# Patient Record
Sex: FEMALE | Race: OTHER | URBAN - METROPOLITAN AREA
[De-identification: names, ages, dates, MRNs, and addresses within clinical notes are randomized per-mention and may not be internally consistent; named-entity substitution may affect disease eponyms.]

---

## 2017-04-24 VITALS
HEART RATE: 57 BPM | TEMPERATURE: 98 F | SYSTOLIC BLOOD PRESSURE: 128 MMHG | OXYGEN SATURATION: 97 % | DIASTOLIC BLOOD PRESSURE: 63 MMHG | HEIGHT: 60 IN | WEIGHT: 174.39 LBS

## 2017-04-24 RX ORDER — CHLORHEXIDINE GLUCONATE 213 G/1000ML
1 SOLUTION TOPICAL ONCE
Qty: 0 | Refills: 0 | Status: DISCONTINUED | OUTPATIENT
Start: 2017-04-25 | End: 2017-05-01

## 2017-04-24 NOTE — H&P ADULT - HISTORY OF PRESENT ILLNESS
***Hx obtained from son- Phong Antonio***    70 y/o Greek speaking Female, with PMHx significant for hyperlipidemia, depression, who was admitted to  last week for near syncopal episode, was found to be bradycardic with HR of 48 as per son.  Pt reports feeling dizzy and lightheaded upon standing up from sitting position, denied LOC.  Pt denied any chest pain, SOB, n/v, palpitations, diaphoresis, orthopnea, PND. Pt was r/o for MI.  Further cardiac work-up included an ECHO which showed an EF 69%, mild TR, and a stress ECHO on 4/19/17 which revealed EF 60%, hypertrophic cardiomyopathy, stress induced ischemia of the basal-mid inferior wall and entire septal wall.  Pt was started on a new regimen of medications: Aspirin, Plavix, and low dose Verapamil.    In light of pt's risk factors, above symptoms and recent positive stress test, she is recommended for cardiac cath with possible intervention for suspected CAD. ***Hx obtained from son- Phong Antonio***    72 y/o Bolivian speaking Female, with PMHx significant for hyperlipidemia, depression, who was admitted to  last week for near syncopal episode, was found to be bradycardic with HR of 48 as per son.  Pt reports feeling dizzy and lightheaded upon standing up from sitting position, denied LOC.  Pt denied any chest pain, SOB, n/v, palpitations, diaphoresis, orthopnea, PND. Pt was r/o for MI.  Further cardiac work-up included an ECHO which showed an EF 69%, mild TR, and a stress ECHO on 4/19/17 which revealed EF 60%, hypertrophic cardiomyopathy, stress induced ischemia of the basal-mid inferior wall and entire septal wall.  Pt was started on a new regimen of medications: Aspirin, Plavix, and low dose Verapamil.    In light of pt's risk factors, above symptoms and recent positive stress ECHO, she is recommended for cardiac cath with possible intervention for suspected CAD.

## 2017-04-24 NOTE — H&P ADULT - NSHPLABSRESULTS_GEN_ALL_CORE
ECG:sinus jennifer @57 BPM with LVH and 1 degree AV block with HI duration of 200 ms and prolonged /473 ms duration

## 2017-04-24 NOTE — H&P ADULT - ASSESSMENT
72 y/o Kinyarwanda speaking Female, with PMHx significant for hyperlipidemia, depression, who was admitted to  last week for near syncopal episode, was found to be bradycardic with HR of 48 as per son. Pt presents today to [facility] for recommended cardiac cath with possible intervention for suspected CAD in light of pt's risk factors, above symptoms, recent positive stress echo.    ASA III and Mallamapti III    OF NOTE: The consent was taken and procedure was explained with the help of [employer]  Atul #[id number].    Risks & benefits of procedure and alternative therapy have been explained to the patient including but not limited to: allergic reaction, bleeding w/possible need for blood transfusion, infection, renal and vascular compromise, limb damage, arrhythmia, stroke, vessel dissection/perforation, Myocardial infarction, emergent CABG. Informed consent obtained and in chart. 70 y/o Kiswahili speaking Female, with PMHx significant for hyperlipidemia, depression, who was admitted to  last week for near syncopal episode, was found to be bradycardic with HR of 48 as per son. Pt presents today to [facility] for recommended cardiac cath with possible intervention for suspected CAD in light of pt's risk factors, above symptoms, recent positive stress echo.    ASA III and Mallamapti III    OF NOTE: The consent was taken and procedure was explained with the help of [employer]  Atul #[id number]. Pt was given ASA 81 mg POx1 and Plavix 75 mg POx1.    Risks & benefits of procedure and alternative therapy have been explained to the patient including but not limited to: allergic reaction, bleeding w/possible need for blood transfusion, infection, renal and vascular compromise, limb damage, arrhythmia, stroke, vessel dissection/perforation, Myocardial infarction, emergent CABG. Informed consent obtained and in chart.

## 2017-04-25 ENCOUNTER — INPATIENT (INPATIENT)
Facility: HOSPITAL | Age: 72
LOS: 5 days | Discharge: HOME HEALTH | DRG: 225 | End: 2017-05-01
Attending: INTERNAL MEDICINE | Admitting: INTERNAL MEDICINE
Payer: COMMERCIAL

## 2017-04-25 LAB
ANION GAP SERPL CALC-SCNC: 8 MMOL/L — LOW (ref 9–16)
APTT BLD: 34.6 SEC — SIGNIFICANT CHANGE UP (ref 27.5–37.4)
BASOPHILS NFR BLD AUTO: 0.9 % — SIGNIFICANT CHANGE UP (ref 0–2)
BUN SERPL-MCNC: 21 MG/DL — SIGNIFICANT CHANGE UP (ref 7–23)
CALCIUM SERPL-MCNC: 9 MG/DL — SIGNIFICANT CHANGE UP (ref 8.5–10.5)
CHLORIDE SERPL-SCNC: 108 MMOL/L — SIGNIFICANT CHANGE UP (ref 96–108)
CO2 SERPL-SCNC: 25 MMOL/L — SIGNIFICANT CHANGE UP (ref 22–31)
CREAT SERPL-MCNC: 0.84 MG/DL — SIGNIFICANT CHANGE UP (ref 0.5–1.3)
EOSINOPHIL NFR BLD AUTO: 2.4 % — SIGNIFICANT CHANGE UP (ref 0–6)
GLUCOSE SERPL-MCNC: 90 MG/DL — SIGNIFICANT CHANGE UP (ref 70–99)
HCT VFR BLD CALC: 42.5 % — SIGNIFICANT CHANGE UP (ref 34.5–45)
HGB BLD-MCNC: 14.8 G/DL — SIGNIFICANT CHANGE UP (ref 11.5–15.5)
INR BLD: 0.96 — SIGNIFICANT CHANGE UP (ref 0.88–1.16)
LYMPHOCYTES # BLD AUTO: 35.1 % — SIGNIFICANT CHANGE UP (ref 13–44)
MCHC RBC-ENTMCNC: 29 PG — SIGNIFICANT CHANGE UP (ref 27–34)
MCHC RBC-ENTMCNC: 34.8 G/DL — SIGNIFICANT CHANGE UP (ref 32–36)
MCV RBC AUTO: 83.2 FL — SIGNIFICANT CHANGE UP (ref 80–100)
MONOCYTES NFR BLD AUTO: 7.7 % — SIGNIFICANT CHANGE UP (ref 2–14)
NEUTROPHILS NFR BLD AUTO: 53.9 % — SIGNIFICANT CHANGE UP (ref 43–77)
PLATELET # BLD AUTO: 279 K/UL — SIGNIFICANT CHANGE UP (ref 150–400)
POTASSIUM SERPL-MCNC: 4.4 MMOL/L — SIGNIFICANT CHANGE UP (ref 3.5–5.3)
POTASSIUM SERPL-SCNC: 4.4 MMOL/L — SIGNIFICANT CHANGE UP (ref 3.5–5.3)
PROTHROM AB SERPL-ACNC: 10.7 SEC — SIGNIFICANT CHANGE UP (ref 9.8–12.7)
RBC # BLD: 5.11 M/UL — SIGNIFICANT CHANGE UP (ref 3.8–5.2)
RBC # FLD: 14.3 % — SIGNIFICANT CHANGE UP (ref 10.3–16.9)
SODIUM SERPL-SCNC: 141 MMOL/L — SIGNIFICANT CHANGE UP (ref 135–145)
WBC # BLD: 6.6 K/UL — SIGNIFICANT CHANGE UP (ref 3.8–10.5)
WBC # FLD AUTO: 6.6 K/UL — SIGNIFICANT CHANGE UP (ref 3.8–10.5)

## 2017-04-25 PROCEDURE — 93460 R&L HRT ART/VENTRICLE ANGIO: CPT | Mod: 26

## 2017-04-25 PROCEDURE — 99223 1ST HOSP IP/OBS HIGH 75: CPT

## 2017-04-25 PROCEDURE — 93010 ELECTROCARDIOGRAM REPORT: CPT

## 2017-04-25 PROCEDURE — 93306 TTE W/DOPPLER COMPLETE: CPT | Mod: 26

## 2017-04-25 PROCEDURE — 75561 CARDIAC MRI FOR MORPH W/DYE: CPT | Mod: 26

## 2017-04-25 RX ORDER — SODIUM CHLORIDE 9 MG/ML
1000 INJECTION INTRAMUSCULAR; INTRAVENOUS; SUBCUTANEOUS
Qty: 0 | Refills: 0 | Status: DISCONTINUED | OUTPATIENT
Start: 2017-04-25 | End: 2017-05-01

## 2017-04-25 RX ORDER — CLOPIDOGREL BISULFATE 75 MG/1
75 TABLET, FILM COATED ORAL ONCE
Qty: 0 | Refills: 0 | Status: COMPLETED | OUTPATIENT
Start: 2017-04-25 | End: 2017-04-25

## 2017-04-25 RX ORDER — ASPIRIN/CALCIUM CARB/MAGNESIUM 324 MG
81 TABLET ORAL ONCE
Qty: 0 | Refills: 0 | Status: DISCONTINUED | OUTPATIENT
Start: 2017-04-25 | End: 2017-04-25

## 2017-04-25 RX ORDER — ZOLPIDEM TARTRATE 10 MG/1
5 TABLET ORAL ONCE
Qty: 0 | Refills: 0 | Status: DISCONTINUED | OUTPATIENT
Start: 2017-04-25 | End: 2017-04-25

## 2017-04-25 RX ORDER — SODIUM CHLORIDE 9 MG/ML
500 INJECTION INTRAMUSCULAR; INTRAVENOUS; SUBCUTANEOUS
Qty: 0 | Refills: 0 | Status: DISCONTINUED | OUTPATIENT
Start: 2017-04-25 | End: 2017-04-25

## 2017-04-25 RX ORDER — ZOLPIDEM TARTRATE 10 MG/1
5 TABLET ORAL AT BEDTIME
Qty: 0 | Refills: 0 | Status: DISCONTINUED | OUTPATIENT
Start: 2017-04-25 | End: 2017-05-01

## 2017-04-25 RX ORDER — VERAPAMIL HCL 240 MG
80 CAPSULE, EXTENDED RELEASE PELLETS 24 HR ORAL EVERY 12 HOURS
Qty: 0 | Refills: 0 | Status: DISCONTINUED | OUTPATIENT
Start: 2017-04-25 | End: 2017-04-28

## 2017-04-25 RX ORDER — ASPIRIN/CALCIUM CARB/MAGNESIUM 324 MG
81 TABLET ORAL DAILY
Qty: 0 | Refills: 0 | Status: DISCONTINUED | OUTPATIENT
Start: 2017-04-26 | End: 2017-05-01

## 2017-04-25 RX ORDER — ATORVASTATIN CALCIUM 80 MG/1
20 TABLET, FILM COATED ORAL DAILY
Qty: 0 | Refills: 0 | Status: DISCONTINUED | OUTPATIENT
Start: 2017-04-25 | End: 2017-05-01

## 2017-04-25 RX ORDER — ASPIRIN/CALCIUM CARB/MAGNESIUM 324 MG
81 TABLET ORAL ONCE
Qty: 0 | Refills: 0 | Status: COMPLETED | OUTPATIENT
Start: 2017-04-25 | End: 2017-04-25

## 2017-04-25 RX ORDER — INFLUENZA VIRUS VACCINE 15; 15; 15; 15 UG/.5ML; UG/.5ML; UG/.5ML; UG/.5ML
0.5 SUSPENSION INTRAMUSCULAR ONCE
Qty: 0 | Refills: 0 | Status: COMPLETED | OUTPATIENT
Start: 2017-04-25 | End: 2017-04-25

## 2017-04-25 RX ORDER — ACETAMINOPHEN 500 MG
650 TABLET ORAL EVERY 6 HOURS
Qty: 0 | Refills: 0 | Status: DISCONTINUED | OUTPATIENT
Start: 2017-04-25 | End: 2017-05-01

## 2017-04-25 RX ORDER — CITALOPRAM 10 MG/1
10 TABLET, FILM COATED ORAL DAILY
Qty: 0 | Refills: 0 | Status: DISCONTINUED | OUTPATIENT
Start: 2017-04-25 | End: 2017-05-01

## 2017-04-25 RX ORDER — OLANZAPINE 15 MG/1
10 TABLET, FILM COATED ORAL DAILY
Qty: 0 | Refills: 0 | Status: DISCONTINUED | OUTPATIENT
Start: 2017-04-25 | End: 2017-05-01

## 2017-04-25 RX ADMIN — ZOLPIDEM TARTRATE 5 MILLIGRAM(S): 10 TABLET ORAL at 23:32

## 2017-04-25 RX ADMIN — SODIUM CHLORIDE 75 MILLILITER(S): 9 INJECTION INTRAMUSCULAR; INTRAVENOUS; SUBCUTANEOUS at 12:16

## 2017-04-25 RX ADMIN — CITALOPRAM 10 MILLIGRAM(S): 10 TABLET, FILM COATED ORAL at 19:16

## 2017-04-25 RX ADMIN — Medication 650 MILLIGRAM(S): at 21:09

## 2017-04-25 RX ADMIN — SODIUM CHLORIDE 40 MILLILITER(S): 9 INJECTION INTRAMUSCULAR; INTRAVENOUS; SUBCUTANEOUS at 15:08

## 2017-04-25 RX ADMIN — CLOPIDOGREL BISULFATE 75 MILLIGRAM(S): 75 TABLET, FILM COATED ORAL at 12:17

## 2017-04-25 RX ADMIN — Medication 650 MILLIGRAM(S): at 20:01

## 2017-04-25 RX ADMIN — Medication 81 MILLIGRAM(S): at 12:17

## 2017-04-25 RX ADMIN — ATORVASTATIN CALCIUM 20 MILLIGRAM(S): 80 TABLET, FILM COATED ORAL at 23:30

## 2017-04-25 NOTE — CONSULT NOTE ADULT - SUBJECTIVE AND OBJECTIVE BOX
CHIEF COMPLAINT: syncope    HISTORY OF PRESENT ILLNESS: 72 yo F (Persian speaking)  , with history of HLD, depression, who was admitted to  last week for near syncopal episode, was found to be bradycardic with HR of 48 as per son.  Pt reports feeling dizzy and lightheaded upon standing up from sitting position,     PAST MEDICAL & SURGICAL HISTORY:  Hemorrhoids  Depression  Hyperlipidemia  No significant past surgical history        PERTINENT DIAGNOSTIC TESTING:    [ ] Echocardiogram:      Allergies    No Known Allergies    Intolerances    	    MEDICATIONS:  verapamil 80milliGRAM(s) Oral every 12 hours        citalopram 10milliGRAM(s) Oral daily  OLANZapine 10milliGRAM(s) Oral daily  zolpidem 5milliGRAM(s) Oral at bedtime PRN  zolpidem 5milliGRAM(s) Oral at bedtime PRN      atorvastatin 20milliGRAM(s) Oral daily    chlorhexidine 4% Liquid 1Application(s) Topical once  sodium chloride 0.9%. 1000milliLiter(s) IV Continuous <Continuous>      FAMILY HISTORY:  No pertinent family history in first degree relatives      SOCIAL HISTORY:    [ ] Non-smoker  [ ] Smoker  [ ] Alcohol        REVIEW OF SYSTEMS:    CONSTITUTIONAL: No fever, weight loss, or fatigue  EYES: No eye pain, visual disturbances, or discharge  ENMT:  No difficulty hearing, tinnitus, vertigo; No sinus or throat pain  NECK: No pain or stiffness  BREASTS: No pain, masses, or nipple discharge  RESPIRATORY: No cough, wheezing, chills or hemoptysis; No Shortness of Breath  CARDIOVASCULAR: No chest pain, palpitations, dizziness, or leg swelling  GASTROINTESTINAL: No abdominal or epigastric pain. No nausea, vomiting, or hematemesis; No diarrhea or constipation. No melena or hematochezia.  GENITOURINARY: No dysuria, frequency, hematuria, or incontinence  NEUROLOGICAL: No headaches, memory loss, loss of strength, numbness, or tremors  SKIN: No itching, burning, rashes, or lesions   LYMPH Nodes: No enlarged glands  ENDOCRINE: No heat or cold intolerance; No hair loss  MUSCULOSKELETAL: No joint pain or swelling; No muscle, back, or extremity pain  PSYCHIATRIC: No depression, anxiety, mood swings, or difficulty sleeping  HEME/LYMPH: No easy bruising, or bleeding gums  ALLERY AND IMMUNOLOGIC: No hives or eczema	    [ ] All others negative	  [ ] Unable to obtain    PHYSICAL EXAM:  T(C): --  HR: --  BP: --  RR: --  SpO2: --  Wt(kg): --  I&O's Summary      TELEMETRY: 	    ECG:     Appearance: Normal	  HEENT:   Normal oral mucosa, PERRL, EOMI	  Cardiovascular: Normal S1 S2, No JVD, No murmurs, No edema  Respiratory: Lungs clear to auscultation	  Gastrointestinal:  Soft, Non-tender, + BS	  Neurologic: A&O x 3, Non-focal  Extremities: Normal range of motion, No clubbing, cyanosis or edema  Vascular: Peripheral pulses palpable 2+ bilaterally    	  LABS:	 	    CARDIAC MARKERS:                                  14.8   6.6   )-----------( 279      ( 25 Apr 2017 11:15 )             42.5     04-25    141  |  108  |  21  ----------------------------<  90  4.4   |  25  |  0.84    Ca    9.0      25 Apr 2017 11:15      proBNP:   Lipid Profile:   HgA1c:   TSH:     ASSESSMENT/PLAN: CHIEF COMPLAINT: syncope    HISTORY OF PRESENT ILLNESS: 72 yo F (French speaking)  , with history of HLD, depression, who was admitted to  last week for near syncopal episode, was found to be bradycardic with HR of 48 as per son, dizziness  and lightheadedness Patient was transferred to [facility] for cardiac angiogram and noted to have HOCM.    PAST MEDICAL & SURGICAL HISTORY:  Hemorrhoids  Depression  Hyperlipidemia  No significant past surgical history        PERTINENT DIAGNOSTIC TESTING:    [ ] Echocardiogram: stress ECHO on 4/19/17 which revealed EF 60%, hypertrophic cardiomyopathy      Allergies    No Known Allergies    Intolerances    	    MEDICATIONS:  verapamil 80milliGRAM(s) Oral every 12 hours  citalopram 10milliGRAM(s) Oral daily  OLANZapine 10milliGRAM(s) Oral daily  zolpidem 5milliGRAM(s) Oral at bedtime PRN  zolpidem 5milliGRAM(s) Oral at bedtime PRN  atorvastatin 20milliGRAM(s) Oral daily  chlorhexidine 4% Liquid 1Application(s) Topical once  sodium chloride 0.9%. 1000milliLiter(s) IV Continuous <Continuous>      FAMILY HISTORY:  No pertinent family history in first degree relatives      SOCIAL HISTORY:    [x ] Non-smoker  [ ] Smoker  [ ] Alcohol        REVIEW OF SYSTEMS:    CONSTITUTIONAL: No fever, weight loss, or fatigue  EYES: No eye pain, visual disturbances, or discharge  ENMT:  No difficulty hearing, tinnitus, vertigo; No sinus or throat pain  NECK: No pain or stiffness  RESPIRATORY: No cough, wheezing, chills or hemoptysis; No Shortness of Breath  CARDIOVASCULAR: No chest pain, palpitations, + dizziness, or leg swelling  GASTROINTESTINAL: No abdominal or epigastric pain. No nausea, vomiting, or hematemesis; No diarrhea or constipation. No melena or hematochezia.  GENITOURINARY: No dysuria, frequency, hematuria, or incontinence  NEUROLOGICAL: No headaches, memory loss, loss of strength, numbness, or tremors  SKIN: No itching, burning, rashes, or lesions     PHYSICAL EXAM:      TELEMETRY: 	 SR   ECG: PND    Appearance: Normal	  HEENT:   EOMI	  Cardiovascular: Normal S1 S2, No JVD, No murmurs, no edema  Respiratory: Lungs clear to auscultation	  Gastrointestinal:  Soft, Non-tender, + BS	  Neurologic: A&O x 3  Extremities: no edema      	  LABS:	 	    CARDIAC MARKERS:                                  14.8   6.6   )-----------( 279      ( 25 Apr 2017 11:15 )             42.5     04-25    141  |  108  |  21  ----------------------------<  90  4.4   |  25  |  0.84    Ca    9.0      25 Apr 2017 11:15      proBNP:   Lipid Profile:   HgA1c:   TSH:     ASSESSMENT/PLAN: 	   72 yo F (French speaking), with history of HLD, depression, who was admitted to  last week for near syncopal episode, was found to be bradycardic with HR of 48 as per son, dizziness  and lightheadedness Patient was transferred to [facility] for cardiac angiogram and noted to have HOCM.  Please get echocardiogram and cardiac MRI, will follow.

## 2017-04-25 NOTE — PROGRESS NOTE ADULT - SUBJECTIVE AND OBJECTIVE BOX
Patient with syncope and ?CP at [facility] last week  Found to have HOCM and abn Dobutamine stress echo    Now post R & L Cath    Findings:  Right sided pressures are not significantly elevated    LM - Luminal Irreg  LAD - Very large, clear obstruction of septal arteries in systole  LCx - Luminal Irreg  RCA - Dominant, luminal irreg    LV significant hypertrophy, hyperdynamic  EF 70% range    Resting gradient LV-Ao of approximately 35-40 mmHg  With significant increase of gradient and decrease of Ao pulse pressure after PVC beat    Plan:  1. Admit to 5 uris and plan for cardiac MRI  2. EP eval- Dr. Evans  3. Continue and uptitrate Verapamil  4. Structural heart eval - Dr. Be  5. Cont ASA, stop Plavix    Troy Mangla, DO FSCAI

## 2017-04-25 NOTE — CONSULT NOTE ADULT - SUBJECTIVE AND OBJECTIVE BOX
Surgeon:    Requesting Physician:    HISTORY OF PRESENT ILLNESS:  70 y/o [place] speaking F, with PMHx significant for hyperlipidemia, depression, who was admitted to  last week for near syncopal episode, was found to be bradycardic with HR of 48 as per son.  Pt reports feeling dizzy and lightheaded upon standing up from sitting position, denied LOC.  Pt denied any chest pain, SOB, n/v, palpitations, diaphoresis, orthopnea, PND. Pt was r/o for MI.  Further cardiac work-up included an ECHO which showed an EF 69%, mild TR, and a stress ECHO on 4/19/17 which revealed EF 60%, hypertrophic cardiomyopathy, stress induced ischemia of the basal-mid inferior wall and entire septal wall.  Pt was started on a new regimen of medications: Aspirin, Plavix, and low dose Verapamil. In light of pt's risk factors, above symptoms and recent positive stress ECHO, she is recommended for cardiac cath with possible intervention for suspected CAD. Cardiac Catheterization on 4/25/17 revealed right sided pressures are not significantly elevated. LAD - Very large, clear obstruction of septal arteries in systole. LV significant hypertrophy, hyperdynamic EF 70%. Resting gradient LV-Ao of approximately 35-40 mmHg. With significant increase of gradient and decrease of Ao pulse pressure after PVC beat.  In light of pts HCM seen on cardiac cath Dr. Be was consulted for evaluation.      PAST MEDICAL & SURGICAL HISTORY:  Hemorrhoids  Depression  Hyperlipidemia  No significant past surgical history      MEDICATIONS  (STANDING):  chlorhexidine 4% Liquid 1Application(s) Topical once  sodium chloride 0.9%. 1000milliLiter(s) IV Continuous <Continuous>  verapamil 80milliGRAM(s) Oral every 12 hours  citalopram 10milliGRAM(s) Oral daily  atorvastatin 20milliGRAM(s) Oral daily  OLANZapine 10milliGRAM(s) Oral daily    MEDICATIONS  (PRN):  zolpidem 5milliGRAM(s) Oral at bedtime PRN Insomnia  zolpidem 5milliGRAM(s) Oral at bedtime PRN Insomnia      Allergies    No Known Allergies    Intolerances        SOCIAL HISTORY:  Smoker:  YES / NO        PACK YEARS:                         WHEN QUIT?  ETOH use:  YES / NO               FREQUENCY / QUANTITY:  Ilicit Drug use:  YES / NO  Occupation:  Assisted device use (Cane / Walker):  Live with:    FAMILY HISTORY:  No pertinent family history in first degree relatives      Review of Systems  CONSTITUTIONAL:  Fevers / chills, sweats, fatigue, weight loss, weight gain                                    NEGATIVE  NEURO:  parathesias, seizures, syncope, confusion                                                                               NEGATIVE  EYES:  Blurry vision, discharge, pain, loss of vision                                                                                  NEGATIVE  ENMT:  Difficulty hearing, vertigo, dysphagia, epistaxis, recent dental work                                     NEGATIVE  CV:  Chest pain, palpitations, LOVELL, orthopnea                                                                                         NEGATIVE  RESPIRATORY:  Wheezing, SOB, cough / sputum, hemoptysis                                                              NEGATIVE  GI:  Nausea, vommiting, diarrhea, constipation, melena                                                                        NEGATIVE  : Hematuria, dysuria, urgency, incontinence                                                                                       NEGATIVE  MUSKULOSKELETAL:  arthritis, joint swelling, muscle weakness                                                           NEGATIVE  SKIN/BREAST:  rash, itching, bacilio loss, masses                                                                                            NEGATIVE  PSYCH:  depresion, anxiety, suicidal ideation                                                                                             NEGATIVE  HEME/LYMPH:  bruises easily, enlarged lymph nodes, tender lymph nodes                                        NEGATIVE  ENDOCRINE:  cold intolerance, heat intolerance, polydipsia                                                                   NEGATIVE    PHYSICAL EXAM  Vital Signs Last 24 Hrs  T(C): --  T(F): --  HR: --  BP: --  BP(mean): --  RR: --  SpO2: --    CONSTITUTIONAL:                                                                          WNL  NEURO:                                                                                             WNL                      EYES:                                                                                                  WNL  ENMT:                                                                                                WNL  CV:                                                                                                      WNL  RESPIRATORY:                                                                                  WNL  GI:                                                                                                       WNL  : LIZ + / -                                                                                 WNL  MUSKULOSKELETAL:                                                                       WNL  SKIN / BREAST:                                                                                 WNL                                                          LABS:                        14.8   6.6   )-----------( 279      ( 25 Apr 2017 11:15 )             42.5     04-25    141  |  108  |  21  ----------------------------<  90  4.4   |  25  |  0.84    Ca    9.0      25 Apr 2017 11:15      PT/INR - ( 25 Apr 2017 11:15 )   PT: 10.7 sec;   INR: 0.96          PTT - ( 25 Apr 2017 11:15 )  PTT:34.6 sec            RADIOLOGY & ADDITIONAL STUDIES:  CAROTID U/S:    CXR:    CT Scan:    EKG:    TTE / SWETHA:    Cardiac Cath: Surgeon: Dr. Be     Requesting Physician: Dr. Stephenson     HISTORY OF PRESENT ILLNESS:  72 y/o [place] speaking F, with PMHx significant for HLD, depression, who was admitted to  last week for near syncopal episode, was found to be bradycardic with HR of 48 as per son.  Pt reports feeling dizzy and lightheaded upon standing up from sitting position, denied LOC.  Pt denied any chest pain, SOB, n/v, palpitations, diaphoresis, orthopnea, PND. Pt was r/o for MI.  Further cardiac work-up included an ECHO which showed an EF 69%, mild TR, and a stress ECHO on 4/19/17 which revealed EF 60%, hypertrophic cardiomyopathy, stress induced ischemia of the basal-mid inferior wall and entire septal wall.  Pt was started on a new regimen of medications: Aspirin, Plavix, and low dose Verapamil. In light of pt's risk factors, above symptoms and recent positive stress ECHO, she is recommended for cardiac cath. Cardiac Catheterization on 4/25/17 revealed right sided pressures are not significantly elevated. LAD - Very large, clear obstruction of septal arteries in systole. LV significant hypertrophy, hyperdynamic EF 70%. Resting gradient LV-Ao of approximately 35-40 mmHg. With significant increase of gradient and decrease of Ao pulse pressure after PVC beat.  In light of pts HCM seen on cardiac cath Dr. Be was consulted for evaluation.      PAST MEDICAL & SURGICAL HISTORY:  Hemorrhoids  Depression  Hyperlipidemia  No significant past surgical history      MEDICATIONS  (STANDING):  chlorhexidine 4% Liquid 1Application(s) Topical once  sodium chloride 0.9%. 1000milliLiter(s) IV Continuous <Continuous>  verapamil 80milliGRAM(s) Oral every 12 hours  citalopram 10milliGRAM(s) Oral daily  atorvastatin 20milliGRAM(s) Oral daily  OLANZapine 10milliGRAM(s) Oral daily    MEDICATIONS  (PRN):  zolpidem 5milliGRAM(s) Oral at bedtime PRN Insomnia  zolpidem 5milliGRAM(s) Oral at bedtime PRN Insomnia      Allergies: No Known Allergies    Intolerances    SOCIAL HISTORY:  Smoker:  YES / NO        PACK YEARS:                         WHEN QUIT?  ETOH use:  YES / NO               FREQUENCY / QUANTITY:  Ilicit Drug use:  YES / NO  Occupation:  Assisted device use (Cane / Walker):  Live with:    FAMILY HISTORY:  No pertinent family history in first degree relatives      Review of Systems  CONSTITUTIONAL:    NEGATIVE  NEURO: +near syncope, +dizziness, +lightheadedness   EYES:  NEGATIVE  ENMT:   NEGATIVE  CV:  -Chest pain, -palpitations, -LOVELL, -orthopnea      RESPIRATORY:   NEGATIVE  GI:  NEGATIVE  :   NEGATIVE  MUSKULOSKELETAL:   NEGATIVE  SKIN/BREAST:      NEGATIVE  PSYCH:  +depression  HEME/LYMPH:    NEGATIVE  ENDOCRINE: NEGATIVE    PHYSICAL EXAM  Vital Signs Last 24 Hrs  T(C): --  T(F): --  HR: --  BP: --  BP(mean): --  RR: --  SpO2: --    CONSTITUTIONAL:                                                                          WNL  NEURO:                                                                                             WNL                      EYES:                                                                                                  WNL  ENMT:                                                                                                WNL  CV:                                                                                                      WNL  RESPIRATORY:                                                                                  WNL  GI:                                                                                                       WNL  : LIZ + / -                                                                                 WNL  MUSKULOSKELETAL:                                                                       WNL  SKIN / BREAST:                                                                                 WNL                                                          LABS:                        14.8   6.6   )-----------( 279      ( 25 Apr 2017 11:15 )             42.5     04-25    141  |  108  |  21  ----------------------------<  90  4.4   |  25  |  0.84    Ca    9.0      25 Apr 2017 11:15      PT/INR - ( 25 Apr 2017 11:15 )   PT: 10.7 sec;   INR: 0.96  PTT - ( 25 Apr 2017 11:15 )  PTT:34.6 sec    RADIOLOGY & ADDITIONAL STUDIES:  CAROTID U/S: ORDERED     CXR: ORDERED    EKG: ORDERED    TTE / SWETHA: See HPI: stress ECHO on 4/19/17 which revealed EF 60%, hypertrophic cardiomyopathy, stress induced ischemia of the basal-mid inferior wall and entire septal wall.    Cardiac Cath: R + L CATH 4/25/17: Right sided pressures are not significantly elevated. LAD - Very large, clear obstruction of septal arteries in systole. LV significant hypertrophy, hyperdynamic EF 70%. Resting gradient LV-Ao of approximately 35-40 mmHg. With significant increase of gradient and decrease of Ao pulse pressure after PVC beat    Assessment:    Plan: Surgeon: Dr. Tamayo     Requesting Physician: Dr. Stephenson     HISTORY OF PRESENT ILLNESS:  70 y/o [place] citizen speaking F, with PMHx significant for HLD, depression, who was admitted to  last week for near syncopal episode, was found to be bradycardic with HR of 48 as per son.  Pt reports feeling dizzy and lightheaded upon standing up from sitting position, denied LOC.  Pt denied any chest pain, SOB, n/v, palpitations, diaphoresis, orthopnea, PND. Pt was r/o for MI.  Further cardiac work-up included an ECHO which showed an EF 69%, mild TR, and a stress ECHO on 4/19/17 which revealed EF 60%, hypertrophic cardiomyopathy, stress induced ischemia of the basal-mid inferior wall and entire septal wall.  Pt was started on a new regimen of medications: Aspirin, Plavix, and low dose Verapamil. In light of pt's risk factors, above symptoms and recent positive stress ECHO, she is recommended for cardiac cath. Cardiac Catheterization on 4/25/17 revealed right sided pressures are not significantly elevated. LAD - Very large, clear obstruction of septal arteries in systole. LV significant hypertrophy, hyperdynamic EF 70%. Resting gradient LV-Ao of approximately 35-40 mmHg. With significant increase of gradient and decrease of Ao pulse pressure after PVC beat.  In light of pts HCM seen on cardiac cath Dr. Tamayo was consulted for evaluation.      PAST MEDICAL & SURGICAL HISTORY:  Hemorrhoids  Depression  Hyperlipidemia  No significant past surgical history      MEDICATIONS  (STANDING):  chlorhexidine 4% Liquid 1Application(s) Topical once  sodium chloride 0.9%. 1000milliLiter(s) IV Continuous <Continuous>  verapamil 80milliGRAM(s) Oral every 12 hours  citalopram 10milliGRAM(s) Oral daily  atorvastatin 20milliGRAM(s) Oral daily  OLANZapine 10milliGRAM(s) Oral daily    MEDICATIONS  (PRN):  zolpidem 5milliGRAM(s) Oral at bedtime PRN Insomnia  zolpidem 5milliGRAM(s) Oral at bedtime PRN Insomnia      Allergies: No Known Allergies    Intolerances    SOCIAL HISTORY:  Smoker:  NO       ETOH use:  NO       Ilicit Drug use:  NO  Assisted device use (Cane / Walker): none, however ambulation has been difficult lately due to dizziness     FAMILY HISTORY:  No pertinent family history in first degree relatives      Review of Systems  CONSTITUTIONAL:    NEGATIVE  NEURO: +near syncope, +dizziness, +lightheadedness   EYES:  NEGATIVE  ENMT:   NEGATIVE  CV:  -Chest pain, -palpitations, -LOVELL, -orthopnea      RESPIRATORY:   NEGATIVE  GI:  NEGATIVE  :   NEGATIVE  MUSKULOSKELETAL:   NEGATIVE  SKIN/BREAST:      NEGATIVE  PSYCH:  +depression  HEME/LYMPH:    NEGATIVE  ENDOCRINE: NEGATIVE    PHYSICAL EXAM  Vital Signs Last 24 Hrs  T(F): 97.6  HR: 60  BP: 128/63  RR: 16  SpO2: 97% RA    General: sitting upright in bed, cooperative during exam   Neuro: moving all extremities with no focal deficits  Cv: RRR, +systolic murmur   Resp: CTA b/l   GI: NT-ND, soft   Vac: +2radial b/l, +2 DP b/l +2femoral b/l   Neck: no bruits or murmur heard  Ext: no edema or calf tenderness b/l                                                           LABS:                        14.8   6.6   )-----------( 279      ( 25 Apr 2017 11:15 )             42.5     04-25    141  |  108  |  21  ----------------------------<  90  4.4   |  25  |  0.84    Ca    9.0      25 Apr 2017 11:15      PT/INR - ( 25 Apr 2017 11:15 )   PT: 10.7 sec;   INR: 0.96  PTT - ( 25 Apr 2017 11:15 )  PTT:34.6 sec    RADIOLOGY & ADDITIONAL STUDIES:    CXR: ORDERED    EKG: ORDERED    TTE / SWETHA: See HPI: stress ECHO on 4/19/17 which revealed EF 60%, hypertrophic cardiomyopathy, stress induced ischemia of the basal-mid inferior wall and entire septal wall.    Cardiac Cath: R + L CATH 4/25/17: Right sided pressures are not significantly elevated. LAD - Very large, clear obstruction of septal arteries in systole. LV significant hypertrophy, hyperdynamic EF 70%. Resting gradient LV-Ao of approximately 35-40 mmHg. With significant increase of gradient and decrease of Ao pulse pressure after PVC beat    Assessment: 70 y/o [place] citizen speaking F, with PMHx significant for HLD, depression, who was admitted to  last week for near syncopal episode, was found to be bradycardic with HR of 48 as per son.  Pt reports feeling dizzy and lightheaded upon standing up from sitting position, denied LOC.  Further cardiac work-up included an ECHO which showed an EF 69%, mild TR, and a stress ECHO on 4/19/17 which revealed EF 60%, hypertrophic cardiomyopathy, stress induced ischemia of the basal-mid inferior wall and entire septal wall.  Cardiac Catheterization on 4/25/17 revealed right sided pressures are not significantly elevated. LAD - Very large, clear obstruction of septal arteries in systole. LV significant hypertrophy, hyperdynamic EF 70%. Resting gradient LV-Ao of approximately 35-40 mmHg. With significant increase of gradient and decrease of Ao pulse pressure after PVC beat.  In light of pts HCM seen on cardiac cath Dr. Tamayo was consulted for evaluation.    Plan:   -Will discuss plan with Dr. tamayo  -f/u Echo results   -Appreciate EPS recs   - Continue medical management as per primary team   -Please order CXR, TSH and A1c Surgeon: Dr. Be     Requesting Physician: Dr. Stephenson     HISTORY OF PRESENT ILLNESS:  72 y/o Portuguese speaking F, with PMHx significant for HLD, depression, who was admitted to  last week for near syncopal episode, was found to be bradycardic with HR of 48 as per son.  Pt reports feeling dizzy and lightheaded upon standing up from sitting position, denied LOC.  Pt denied any chest pain, SOB, n/v, palpitations, diaphoresis, orthopnea, PND. Pt was r/o for MI.  Further cardiac work-up included an ECHO which showed an EF 69%, mild TR, and a stress ECHO on 4/19/17 which revealed EF 60%, hypertrophic cardiomyopathy, stress induced ischemia of the basal-mid inferior wall and entire septal wall.  Pt was started on a new regimen of medications: Aspirin, Plavix, and low dose Verapamil. In light of pt's risk factors, above symptoms and recent positive stress ECHO, she is recommended for cardiac cath. Cardiac Catheterization on 4/25/17 revealed right sided pressures are not significantly elevated. LAD - Very large, clear obstruction of septal arteries in systole. LV significant hypertrophy, hyperdynamic EF 70%. Resting gradient LV-Ao of approximately 35-40 mmHg. With significant increase of gradient and decrease of Ao pulse pressure after PVC beat.  In light of pts HCM seen on cardiac cath Dr. Be was consulted for evaluation.      PAST MEDICAL & SURGICAL HISTORY:  Hemorrhoids  Depression  Hyperlipidemia  No significant past surgical history      MEDICATIONS  (STANDING):  chlorhexidine 4% Liquid 1Application(s) Topical once  sodium chloride 0.9%. 1000milliLiter(s) IV Continuous <Continuous>  verapamil 80milliGRAM(s) Oral every 12 hours  citalopram 10milliGRAM(s) Oral daily  atorvastatin 20milliGRAM(s) Oral daily  OLANZapine 10milliGRAM(s) Oral daily    MEDICATIONS  (PRN):  zolpidem 5milliGRAM(s) Oral at bedtime PRN Insomnia  zolpidem 5milliGRAM(s) Oral at bedtime PRN Insomnia      Allergies: No Known Allergies    Intolerances    SOCIAL HISTORY:  Smoker:  NO       ETOH use:  NO       Ilicit Drug use:  NO  Assisted device use (Cane / Walker): none, however ambulation has been difficult lately due to dizziness     FAMILY HISTORY:  No pertinent family history in first degree relatives      Review of Systems  CONSTITUTIONAL:    NEGATIVE  NEURO: +near syncope, +dizziness, +lightheadedness   EYES:  NEGATIVE  ENMT:   NEGATIVE  CV:  -Chest pain, -palpitations, -LOVELL, -orthopnea      RESPIRATORY:   NEGATIVE  GI:  NEGATIVE  :   NEGATIVE  MUSKULOSKELETAL:   NEGATIVE  SKIN/BREAST:      NEGATIVE  PSYCH:  +depression  HEME/LYMPH:    NEGATIVE  ENDOCRINE: NEGATIVE    PHYSICAL EXAM  Vital Signs Last 24 Hrs  T(F): 97.6  HR: 60  BP: 128/63  RR: 16  SpO2: 97% RA    General: sitting upright in bed, cooperative during exam   Neuro: moving all extremities with no focal deficits  Cv: RRR, +systolic murmur   Resp: CTA b/l   GI: NT-ND, soft   Vac: +2radial b/l, +2 DP b/l +2femoral b/l   Neck: no bruits or murmur heard  Ext: no edema or calf tenderness b/l                                                           LABS:                        14.8   6.6   )-----------( 279      ( 25 Apr 2017 11:15 )             42.5     04-25    141  |  108  |  21  ----------------------------<  90  4.4   |  25  |  0.84    Ca    9.0      25 Apr 2017 11:15      PT/INR - ( 25 Apr 2017 11:15 )   PT: 10.7 sec;   INR: 0.96  PTT - ( 25 Apr 2017 11:15 )  PTT:34.6 sec    RADIOLOGY & ADDITIONAL STUDIES:    CXR: ORDERED    EKG: ORDERED    TTE / SWETHA: See HPI: stress ECHO on 4/19/17 which revealed EF 60%, hypertrophic cardiomyopathy, stress induced ischemia of the basal-mid inferior wall and entire septal wall.    Cardiac Cath: R + L CATH 4/25/17: Right sided pressures are not significantly elevated. LAD - Very large, clear obstruction of septal arteries in systole. LV significant hypertrophy, hyperdynamic EF 70%. Resting gradient LV-Ao of approximately 35-40 mmHg. With significant increase of gradient and decrease of Ao pulse pressure after PVC beat    Assessment: 72 y/o Portuguese speaking F, with PMHx significant for HLD, depression, who was admitted to  last week for near syncopal episode, was found to be bradycardic with HR of 48 as per son.  Pt reports feeling dizzy and lightheaded upon standing up from sitting position, denied LOC.  Further cardiac work-up included an ECHO which showed an EF 69%, mild TR, and a stress ECHO on 4/19/17 which revealed EF 60%, hypertrophic cardiomyopathy, stress induced ischemia of the basal-mid inferior wall and entire septal wall.  Cardiac Catheterization on 4/25/17 revealed right sided pressures are not significantly elevated. LAD - Very large, clear obstruction of septal arteries in systole. LV significant hypertrophy, hyperdynamic EF 70%. Resting gradient LV-Ao of approximately 35-40 mmHg. With significant increase of gradient and decrease of Ao pulse pressure after PVC beat.  In light of pts HCM seen on cardiac cath Dr. Be was consulted for evaluation.  imp: hypertrophic cardiomyopathy with CURTIS/LVOT obstruction with severe gradients; likely mild-moderate aortic stenosis concomittant (predominately LVOTO); chronic diastolic CHF; syncope; depression.  Plan:   -f/u echo results   -appreciate EPS recs; obtain CMR and consideration for DC ICD   -titration of CCB, awaiting consideration for device  -no clear septal branch with setpal thickness < 20mm on TTE, not ideal for EtOH setpal ablation

## 2017-04-26 DIAGNOSIS — R00.1 BRADYCARDIA, UNSPECIFIED: ICD-10-CM

## 2017-04-26 DIAGNOSIS — I42.1 OBSTRUCTIVE HYPERTROPHIC CARDIOMYOPATHY: ICD-10-CM

## 2017-04-26 DIAGNOSIS — F32.9 MAJOR DEPRESSIVE DISORDER, SINGLE EPISODE, UNSPECIFIED: ICD-10-CM

## 2017-04-26 PROBLEM — Z00.00 ENCOUNTER FOR PREVENTIVE HEALTH EXAMINATION: Status: ACTIVE | Noted: 2017-04-26

## 2017-04-26 LAB
ALBUMIN SERPL ELPH-MCNC: 3.3 G/DL — LOW (ref 3.4–5)
ALP SERPL-CCNC: 94 U/L — SIGNIFICANT CHANGE UP (ref 40–120)
ALT FLD-CCNC: 30 U/L — SIGNIFICANT CHANGE UP (ref 12–42)
ANION GAP SERPL CALC-SCNC: 11 MMOL/L — SIGNIFICANT CHANGE UP (ref 9–16)
AST SERPL-CCNC: 21 U/L — SIGNIFICANT CHANGE UP (ref 15–37)
BILIRUB SERPL-MCNC: 0.8 MG/DL — SIGNIFICANT CHANGE UP (ref 0.2–1.2)
BUN SERPL-MCNC: 16 MG/DL — SIGNIFICANT CHANGE UP (ref 7–23)
CALCIUM SERPL-MCNC: 8.8 MG/DL — SIGNIFICANT CHANGE UP (ref 8.5–10.5)
CHLORIDE SERPL-SCNC: 107 MMOL/L — SIGNIFICANT CHANGE UP (ref 96–108)
CO2 SERPL-SCNC: 22 MMOL/L — SIGNIFICANT CHANGE UP (ref 22–31)
CREAT SERPL-MCNC: 0.74 MG/DL — SIGNIFICANT CHANGE UP (ref 0.5–1.3)
GLUCOSE SERPL-MCNC: 84 MG/DL — SIGNIFICANT CHANGE UP (ref 70–99)
HBA1C BLD-MCNC: 5.7 % — HIGH (ref 4.8–5.6)
HCT VFR BLD CALC: 41.1 % — SIGNIFICANT CHANGE UP (ref 34.5–45)
HGB BLD-MCNC: 14.2 G/DL — SIGNIFICANT CHANGE UP (ref 11.5–15.5)
MAGNESIUM SERPL-MCNC: 2.3 MG/DL — SIGNIFICANT CHANGE UP (ref 1.6–2.4)
MCHC RBC-ENTMCNC: 28.7 PG — SIGNIFICANT CHANGE UP (ref 27–34)
MCHC RBC-ENTMCNC: 34.5 G/DL — SIGNIFICANT CHANGE UP (ref 32–36)
MCV RBC AUTO: 83 FL — SIGNIFICANT CHANGE UP (ref 80–100)
PLATELET # BLD AUTO: 278 K/UL — SIGNIFICANT CHANGE UP (ref 150–400)
POTASSIUM SERPL-MCNC: 3.7 MMOL/L — SIGNIFICANT CHANGE UP (ref 3.5–5.3)
POTASSIUM SERPL-SCNC: 3.7 MMOL/L — SIGNIFICANT CHANGE UP (ref 3.5–5.3)
PROT SERPL-MCNC: 7.4 G/DL — SIGNIFICANT CHANGE UP (ref 6.4–8.2)
RBC # BLD: 4.95 M/UL — SIGNIFICANT CHANGE UP (ref 3.8–5.2)
RBC # FLD: 14.4 % — SIGNIFICANT CHANGE UP (ref 10.3–16.9)
SODIUM SERPL-SCNC: 140 MMOL/L — SIGNIFICANT CHANGE UP (ref 135–145)
TSH SERPL-MCNC: 0.72 UIU/ML — SIGNIFICANT CHANGE UP (ref 0.35–4.94)
WBC # BLD: 7.3 K/UL — SIGNIFICANT CHANGE UP (ref 3.8–10.5)
WBC # FLD AUTO: 7.3 K/UL — SIGNIFICANT CHANGE UP (ref 3.8–10.5)

## 2017-04-26 PROCEDURE — 71010: CPT | Mod: 26

## 2017-04-26 PROCEDURE — 93010 ELECTROCARDIOGRAM REPORT: CPT

## 2017-04-26 PROCEDURE — 99233 SBSQ HOSP IP/OBS HIGH 50: CPT

## 2017-04-26 RX ORDER — POTASSIUM CHLORIDE 20 MEQ
20 PACKET (EA) ORAL ONCE
Qty: 0 | Refills: 0 | Status: COMPLETED | OUTPATIENT
Start: 2017-04-26 | End: 2017-04-26

## 2017-04-26 RX ORDER — HEPARIN SODIUM 5000 [USP'U]/ML
5000 INJECTION INTRAVENOUS; SUBCUTANEOUS EVERY 8 HOURS
Qty: 0 | Refills: 0 | Status: DISCONTINUED | OUTPATIENT
Start: 2017-04-26 | End: 2017-05-01

## 2017-04-26 RX ORDER — OLANZAPINE 15 MG/1
10 TABLET, FILM COATED ORAL ONCE
Qty: 0 | Refills: 0 | Status: COMPLETED | OUTPATIENT
Start: 2017-04-26 | End: 2017-04-27

## 2017-04-26 RX ORDER — SODIUM CHLORIDE 9 MG/ML
500 INJECTION INTRAMUSCULAR; INTRAVENOUS; SUBCUTANEOUS
Qty: 0 | Refills: 0 | Status: DISCONTINUED | OUTPATIENT
Start: 2017-04-26 | End: 2017-05-01

## 2017-04-26 RX ADMIN — Medication 80 MILLIGRAM(S): at 06:17

## 2017-04-26 RX ADMIN — OLANZAPINE 10 MILLIGRAM(S): 15 TABLET, FILM COATED ORAL at 11:48

## 2017-04-26 RX ADMIN — Medication 80 MILLIGRAM(S): at 17:34

## 2017-04-26 RX ADMIN — Medication 81 MILLIGRAM(S): at 17:33

## 2017-04-26 RX ADMIN — CITALOPRAM 10 MILLIGRAM(S): 10 TABLET, FILM COATED ORAL at 11:48

## 2017-04-26 RX ADMIN — HEPARIN SODIUM 5000 UNIT(S): 5000 INJECTION INTRAVENOUS; SUBCUTANEOUS at 21:55

## 2017-04-26 RX ADMIN — ATORVASTATIN CALCIUM 20 MILLIGRAM(S): 80 TABLET, FILM COATED ORAL at 11:48

## 2017-04-26 RX ADMIN — ZOLPIDEM TARTRATE 5 MILLIGRAM(S): 10 TABLET ORAL at 23:55

## 2017-04-26 RX ADMIN — Medication 20 MILLIEQUIVALENT(S): at 11:47

## 2017-04-26 RX ADMIN — SODIUM CHLORIDE 75 MILLILITER(S): 9 INJECTION INTRAMUSCULAR; INTRAVENOUS; SUBCUTANEOUS at 14:30

## 2017-04-26 NOTE — PROGRESS NOTE ADULT - ASSESSMENT
71y Female HL, depression who was admitted to  last weak with near syncopal episode and dizziness found to be bradycardic to 48. She was found to have HOCM on a dobutamine stress test. Patient was transferred to [facility] for further evaluation.  Cardiac Catheterization on 4/25/17 revealed LAD - very large, clear obstruction of septal arteries in systole. LV significant hypertrophy, hyperdynamic EF 70%. Resting gradient LV-Ao of approximately 35-40 mmHg. With significant increase of gradient and decrease of Ao pulse pressure after PVC beat.  In light of pts HCM seen on cardiac cath Dr. Be was consulted for evaluation.    -Cardiac: VSS. No more bradycardia. Continue to up-titrate Verapamil as tolerates. ASA, no Plavix (last dose__)   EP (Cristina following recommend Cardiac MRI    -Continue medical management as per primary team   -Please order CXR, TSH and A1c 71y Female HL, depression who was admitted to  last weak with near syncopal episode and dizziness found to be bradycardic to 48. She was found to have HOCM on a dobutamine stress test. Patient was transferred to [facility] for further evaluation.  Cardiac Catheterization on 4/25/17 revealed LAD - very large, clear obstruction of septal arteries in systole. LV significant hypertrophy, hyperdynamic EF 70%. Resting gradient LV-Ao of approximately 35-40 mmHg. With significant increase of gradient and decrease of Ao pulse pressure after PVC beat.  In light of pts HCM seen on cardiac cath Dr. Be was consulted for evaluation.    -Cardiac: VSS. No more bradycardia. Continue to up-titrate Verapamil as tolerates. ASA, no Plavix (last dose 4/25)   EP (Cristina following)   TTE done yesterday showing severe asymmetric septal hypertrophy.    Cardiac MRI results pending   Further plans per Dr. Be    -Pulm: no active issues. Currently good SaO2 or RA.    Please obtain a CXR.     -Continue medical management as per primary team imp: hypertrophic cardiomyopathy with CURTIS/LVOT obstruction with severe gradients; likely mild-moderate aortic stenosis concomittant (predominately LVOTO); chronic diastolic CHF; syncope; depression.  Plan:   -appreciate EPS recs; consideration for DC ICD   -f/u CMR results  -titration of CCB, awaiting consideration for device  -liberal fluids  -no clear septal branch with setpal thickness < 20mm on TTE, not ideal for EtOH setpal ablation

## 2017-04-26 NOTE — PROGRESS NOTE ADULT - SUBJECTIVE AND OBJECTIVE BOX
Interventional Cardiology PA Adult Progress Note    Subjective Assessment: Pt seen and examined at bedside with no complaints. Son at bedside to translate. Discussed plan with son and patient.    MEDICATIONS:  verapamil 80milliGRAM(s) Oral every 12 hours  citalopram 10milliGRAM(s) Oral daily  OLANZapine 10milliGRAM(s) Oral daily  zolpidem 5milliGRAM(s) Oral at bedtime PRN  zolpidem 5milliGRAM(s) Oral at bedtime PRN  acetaminophen   Tablet. 650milliGRAM(s) Oral every 6 hours PRN  atorvastatin 20milliGRAM(s) Oral daily  chlorhexidine 4% Liquid 1Application(s) Topical once  sodium chloride 0.9%. 1000milliLiter(s) IV Continuous <Continuous>  aspirin enteric coated 81milliGRAM(s) Oral daily  influenza   Vaccine 0.5milliLiter(s) IntraMuscular once  sodium chloride 0.9%. 500milliLiter(s) IV Continuous <Continuous>      	    [PHYSICAL EXAM:  TELEMETRY:  T(C): 36.8, Max: 36.8 (04-26 @ 10:41)  HR: 92 (59 - 92)  BP: 114/62 (102/56 - 146/67)  RR: 18 (15 - 18)  SpO2: 96% (96% - 96%)  Wt(kg): --  I&O's Summary    I & Os for current day (as of 26 Apr 2017 14:52)  =============================================  IN: 400 ml / OUT: 300 ml / NET: 100 ml    Height (cm): 152.4 (04-25 @ 18:45)  Weight (kg): 77.2 (04-25 @ 18:45)  BMI (kg/m2): 33.2 (04-25 @ 18:45)  BSA (m2): 1.74 (04-25 @ 18:45)  Travis:  Central/PICC/Mid Line:                                         Appearance: Normal, NAD  Neck: Supple,  - JVD; no Carotid Bruit   Cardiovascular: + Systolic murmur, Normal S1 S2, No JVD  Respiratory: Lungs clear to auscultation b/l, No Rales, Rhonchi, Wheezing	  Gastrointestinal:  Soft, Non-tender, + BS	  Skin: No rashes, No ecchymoses, No cyanosis  Extremities: +1 pitting edema b/l  Neurologic: Non-focal  Psychiatry: A & O x 3, Mood & affect appropriate      LABS:	 	  CARDIAC MARKERS:                          14.2   7.3   )-----------( 278      ( 26 Apr 2017 06:51 )             41.1     04-26    140  |  107  |  16  ----------------------------<  84  3.7   |  22  |  0.74    Ca    8.8      26 Apr 2017 06:50  Mg     2.3     04-26    TPro  7.4  /  Alb  3.3<L>  /  TBili  0.8  /  DBili  x   /  AST  21  /  ALT  30  /  AlkPhos  94  04-26    proBNP:   Lipid Profile:   HgA1c:   TSH:   PT/INR - ( 25 Apr 2017 11:15 )   PT: 10.7 sec;   INR: 0.96          PTT - ( 25 Apr 2017 11:15 )  PTT:34.6 sec

## 2017-04-26 NOTE — PROGRESS NOTE ADULT - PROBLEM SELECTOR PLAN 1
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 Systolic anterior motion of the anterior mitral leaflet, causing left ventricular outflow obstruction. The peak LVOT gradient is 77 mmHg at a heart rate of 65 bpm. Mild pulm HTN.   - Continue Verapamil 80mg BID, consider uptitration   - Per Dr Albert, septal wall appears 1.6-1.7 cm wide, F/u official cardiac MRI results  - Dr Be consulted will follow up official recommendations.   - TSH, HgA1C, CXR ordered per CTS recs

## 2017-04-26 NOTE — PROGRESS NOTE ADULT - ASSESSMENT
70 y/o Kyrgyz speaking Female, with PMHx significant for hyperlipidemia, depression, who was admitted to  last week for near syncopal episode, was found to be bradycardic with HR of 48 as per son. Pt s/p R + L CATH (4/25/17) normal right sided pressures, hyperdynamic EF (70%) LV significant hypertrophy with clear obstruction of septa arteries in systole. Pt was admitted for management of HOCM and symptomatic bradycardia.

## 2017-04-26 NOTE — PROGRESS NOTE ADULT - SUBJECTIVE AND OBJECTIVE BOX
SUBJECTIVE ASSESSMENT:  71 year Hungarian speaking female, no acute complaints. Currently no dizziness.       Vital Signs Last 24 Hrs  T(F): 97.5, Max: 97.5 (04-26 @ 06:40)  HR: 66 (59 - 68) SR  BP: 102/56 (102/56 - 146/67)  RR: 18 (15 - 18)  SpO2: 96% (96% - 96%) RA    I & Os for current day (as of 26 Apr 2017 11:27)  ---------------------------------------------  Total NET: 100 ml      PHYSICAL EXAM:    General:     Neurological:    Cardiovascular:    Respiratory:    Gastrointestinal:    Extremities:        LABS 4/26:                        14.2   7.3   )-----------( 278                  41.1       PT: 10.7 sec;   INR: 0.96     PTT:34.6 sec      140  |  107  |  16  ----------------------------<  84  3.7   |  22  |  0.74    Ca    8.8     Mg     2.3     TPro  7.4  /  Alb  3.3<L>  /  TBili  0.8  /  DBili  x   /  AST  21  /  ALT  30  /  AlkPhos  94       MEDICATIONS  (STANDING):  chlorhexidine 4% Liquid 1Application(s) Topical once  sodium chloride 0.9%. 1000milliLiter(s) IV Continuous <Continuous>  aspirin enteric coated 81milliGRAM(s) Oral daily  verapamil 80milliGRAM(s) Oral every 12 hours  citalopram 10milliGRAM(s) Oral daily  atorvastatin 20milliGRAM(s) Oral daily  OLANZapine 10milliGRAM(s) Oral daily  influenza   Vaccine 0.5milliLiter(s) IntraMuscular once  potassium chloride    Tablet ER 20milliEquivalent(s) Oral once    MEDICATIONS  (PRN):  zolpidem 5milliGRAM(s) Oral at bedtime PRN Insomnia  zolpidem 5milliGRAM(s) Oral at bedtime PRN Insomnia  acetaminophen   Tablet. 650milliGRAM(s) Oral every 6 hours PRN Mild Pain (1 - 3)        RADIOLOGY & ADDITIONAL TESTS:    TTE 4/25: There is severe asymmetric septal hypertrophy. Anteroseptum measures 2.1 cm and basal inferolateral wall measures 1.2 cm.  The left ventricular wall motion is normal. The left ventricle is hyperdynamic and the overall ejection fraction is increased (>75%).  The mitral inflow pattern is consistent with pseudo-normalization, suggestive of moderately elevated left atrial pressure (15-20mmHg).  Mild AI. There is CURTIS of the anterior mitral leaflet, causing left ventricular outflow obstruction. Peak LVOT gradient is 77 mmHg at a heart rate of 65 bpm. Mild pulmonary hypertension. PASP 40 mmHg. Trace IA.    Cardiac Cath 4/25: Right sided pressures are not significantly elevated  LM - Luminal Irreg, LAD - Very large, clear obstruction of septal arteries in systole  LCx - Luminal Irreg, RCA - Dominant, luminal irreg  LV significant hypertrophy, hyperdynamic, EF 70% range  Resting gradient LV-Ao of approximately 35-40 mmHg  With significant increase of gradient and decrease of Ao pulse pressure after PVC beat    ECHO 4/19/17: EF 60%, hypertrophic cardiomyopathy, stress induced ischemia of the basal-mid inferior wall and entire septal wall. SUBJECTIVE ASSESSMENT:  71 year Arabic speaking female, no acute complaints. Currently no dizziness.       Vital Signs Last 24 Hrs  T(F): 97.5, Max: 97.5 (04-26 @ 06:40)  HR: 66 (59 - 68) SR  BP: 102/56 (102/56 - 146/67)  RR: 18 (15 - 18)  SpO2: 96% (96% - 96%) RA    I & Os for current day (as of 26 Apr 2017 11:27)  ---------------------------------------------  Total NET: 100 ml      PHYSICAL EXAM:    General:     Neurological:    Cardiovascular:    Respiratory:    Gastrointestinal:    Extremities:        LABS 4/26:                        14.2   7.3   )-----------( 278                  41.1       PT: 10.7 sec;   INR: 0.96     PTT:34.6 sec      140  |  107  |  16  ----------------------------<  84  3.7   |  22  |  0.74    Ca    8.8     Mg     2.3     TPro  7.4  /  Alb  3.3<L>  /  TBili  0.8  /  DBili  x   /  AST  21  /  ALT  30  /  AlkPhos  94       MEDICATIONS  (STANDING):  sodium chloride 0.9%. 1000milliLiter(s) IV Continuous <Continuous>  aspirin enteric coated 81milliGRAM(s) Oral daily  verapamil 80milliGRAM(s) Oral every 12 hours  citalopram 10milliGRAM(s) Oral daily  atorvastatin 20milliGRAM(s) Oral daily  OLANZapine 10milliGRAM(s) Oral daily    MEDICATIONS  (PRN):  zolpidem 5milliGRAM(s) Oral at bedtime PRN Insomnia  zolpidem 5milliGRAM(s) Oral at bedtime PRN Insomnia  acetaminophen   Tablet. 650milliGRAM(s) Oral every 6 hours PRN Mild Pain (1 - 3)        RADIOLOGY & ADDITIONAL TESTS:        TTE 4/25: There is severe asymmetric septal hypertrophy. Anteroseptum measures 2.1 cm and basal inferolateral wall measures 1.2 cm.  The left ventricular wall motion is normal. The left ventricle is hyperdynamic and the overall ejection fraction is increased (>75%).  The mitral inflow pattern is consistent with pseudo-normalization, suggestive of moderately elevated left atrial pressure (15-20mmHg).  Mild AI. There is CURTIS of the anterior mitral leaflet, causing left ventricular outflow obstruction. Peak LVOT gradient is 77 mmHg at a heart rate of 65 bpm. Mild pulmonary hypertension. PASP 40 mmHg. Trace VA.    Cardiac Cath 4/25: Right sided pressures are not significantly elevated  LM - Luminal Irreg, LAD - Very large, clear obstruction of septal arteries in systole  LCx - Luminal Irreg, RCA - Dominant, luminal irreg  LV significant hypertrophy, hyperdynamic, EF 70% range  Resting gradient LV-Ao of approximately 35-40 mmHg  With significant increase of gradient and decrease of Ao pulse pressure after PVC beat    ECHO 4/19/17: EF 60%, hypertrophic cardiomyopathy, stress induced ischemia of the basal-mid inferior wall and entire septal wall. SUBJECTIVE ASSESSMENT:  71 year Hebrew speaking female, no acute complaints. Currently no dizziness- however has not walked since admission. She normally walks independently short distances around the house and to and from the car to go to [employer]. She has been experiencing dizziness upon standing for about 2 months prior to admission. She currently denies CP or SOB.      Vital Signs Last 24 Hrs  T(F): 97.5, Max: 97.5 (04-26 @ 06:40)  HR: 66 (59 - 68) SR currently 80-90s SR  BP: 102/56 (102/56 - 146/67)  RR: 18 (15 - 18)  SpO2: 96% (96% - 96%) RA    I & Os for current day (as of 26 Apr 2017 11:27)  ---------------------------------------------  Total NET: 100 ml      PHYSICAL EXAM:    General: Lying comfortably, NAD    Neurological: A&O - non focal.     Cardiovascular: RRR II/VI STANISLAW    Respiratory: CTABL    Gastrointestinal: obese, +BS, soft non-tender    Extremities: warm no edema.         LABS 4/26:                        14.2   7.3   )-----------( 278                  41.1       PT: 10.7 sec;   INR: 0.96     PTT:34.6 sec      140  |  107  |  16  ----------------------------<  84  3.7   |  22  |  0.74    Ca    8.8     Mg     2.3     TPro  7.4  /  Alb  3.3<L>  /  TBili  0.8  /  DBili  x   /  AST  21  /  ALT  30  /  AlkPhos  94       MEDICATIONS  (STANDING):  sodium chloride 0.9%. 1000milliLiter(s) IV Continuous <Continuous>  aspirin enteric coated 81milliGRAM(s) Oral daily  verapamil 80milliGRAM(s) Oral every 12 hours  citalopram 10milliGRAM(s) Oral daily  atorvastatin 20milliGRAM(s) Oral daily  OLANZapine 10milliGRAM(s) Oral daily    MEDICATIONS  (PRN):  zolpidem 5milliGRAM(s) Oral at bedtime PRN Insomnia  zolpidem 5milliGRAM(s) Oral at bedtime PRN Insomnia  acetaminophen   Tablet. 650milliGRAM(s) Oral every 6 hours PRN Mild Pain (1 - 3)        RADIOLOGY & ADDITIONAL TESTS:        TTE 4/25: There is severe asymmetric septal hypertrophy. Anteroseptum measures 2.1 cm and basal inferolateral wall measures 1.2 cm.  The left ventricular wall motion is normal. The left ventricle is hyperdynamic and the overall ejection fraction is increased (>75%).  The mitral inflow pattern is consistent with pseudo-normalization, suggestive of moderately elevated left atrial pressure (15-20mmHg).  Mild AI. There is CURTIS of the anterior mitral leaflet, causing left ventricular outflow obstruction. Peak LVOT gradient is 77 mmHg at a heart rate of 65 bpm. Mild pulmonary hypertension. PASP 40 mmHg. Trace [place].    Cardiac Cath 4/25: Right sided pressures are not significantly elevated  LM - Luminal Irreg, LAD - Very large, clear obstruction of septal arteries in systole  LCx - Luminal Irreg, RCA - Dominant, luminal irreg  LV significant hypertrophy, hyperdynamic, EF 70% range  Resting gradient LV-Ao of approximately 35-40 mmHg  With significant increase of gradient and decrease of Ao pulse pressure after PVC beat    ECHO 4/19/17: EF 60%, hypertrophic cardiomyopathy, stress induced ischemia of the basal-mid inferior wall and entire septal wall.

## 2017-04-27 LAB
ANION GAP SERPL CALC-SCNC: 9 MMOL/L — SIGNIFICANT CHANGE UP (ref 9–16)
BUN SERPL-MCNC: 18 MG/DL — SIGNIFICANT CHANGE UP (ref 7–23)
CALCIUM SERPL-MCNC: 8.6 MG/DL — SIGNIFICANT CHANGE UP (ref 8.5–10.5)
CHLORIDE SERPL-SCNC: 108 MMOL/L — SIGNIFICANT CHANGE UP (ref 96–108)
CO2 SERPL-SCNC: 23 MMOL/L — SIGNIFICANT CHANGE UP (ref 22–31)
CREAT SERPL-MCNC: 0.83 MG/DL — SIGNIFICANT CHANGE UP (ref 0.5–1.3)
GLUCOSE SERPL-MCNC: 75 MG/DL — SIGNIFICANT CHANGE UP (ref 70–99)
HCT VFR BLD CALC: 41.4 % — SIGNIFICANT CHANGE UP (ref 34.5–45)
HGB BLD-MCNC: 14.2 G/DL — SIGNIFICANT CHANGE UP (ref 11.5–15.5)
MAGNESIUM SERPL-MCNC: 2.2 MG/DL — SIGNIFICANT CHANGE UP (ref 1.6–2.4)
MCHC RBC-ENTMCNC: 28.7 PG — SIGNIFICANT CHANGE UP (ref 27–34)
MCHC RBC-ENTMCNC: 34.3 G/DL — SIGNIFICANT CHANGE UP (ref 32–36)
MCV RBC AUTO: 83.8 FL — SIGNIFICANT CHANGE UP (ref 80–100)
PLATELET # BLD AUTO: 267 K/UL — SIGNIFICANT CHANGE UP (ref 150–400)
POTASSIUM SERPL-MCNC: 3.8 MMOL/L — SIGNIFICANT CHANGE UP (ref 3.5–5.3)
POTASSIUM SERPL-SCNC: 3.8 MMOL/L — SIGNIFICANT CHANGE UP (ref 3.5–5.3)
RBC # BLD: 4.94 M/UL — SIGNIFICANT CHANGE UP (ref 3.8–5.2)
RBC # FLD: 14.4 % — SIGNIFICANT CHANGE UP (ref 10.3–16.9)
SODIUM SERPL-SCNC: 140 MMOL/L — SIGNIFICANT CHANGE UP (ref 135–145)
WBC # BLD: 7.1 K/UL — SIGNIFICANT CHANGE UP (ref 3.8–10.5)
WBC # FLD AUTO: 7.1 K/UL — SIGNIFICANT CHANGE UP (ref 3.8–10.5)

## 2017-04-27 PROCEDURE — 99233 SBSQ HOSP IP/OBS HIGH 50: CPT

## 2017-04-27 RX ADMIN — Medication 81 MILLIGRAM(S): at 14:10

## 2017-04-27 RX ADMIN — Medication 80 MILLIGRAM(S): at 19:06

## 2017-04-27 RX ADMIN — HEPARIN SODIUM 5000 UNIT(S): 5000 INJECTION INTRAVENOUS; SUBCUTANEOUS at 22:50

## 2017-04-27 RX ADMIN — CITALOPRAM 10 MILLIGRAM(S): 10 TABLET, FILM COATED ORAL at 14:10

## 2017-04-27 RX ADMIN — ATORVASTATIN CALCIUM 20 MILLIGRAM(S): 80 TABLET, FILM COATED ORAL at 22:50

## 2017-04-27 RX ADMIN — OLANZAPINE 10 MILLIGRAM(S): 15 TABLET, FILM COATED ORAL at 00:14

## 2017-04-27 RX ADMIN — OLANZAPINE 10 MILLIGRAM(S): 15 TABLET, FILM COATED ORAL at 22:50

## 2017-04-27 RX ADMIN — Medication 80 MILLIGRAM(S): at 07:08

## 2017-04-27 RX ADMIN — HEPARIN SODIUM 5000 UNIT(S): 5000 INJECTION INTRAVENOUS; SUBCUTANEOUS at 07:08

## 2017-04-27 RX ADMIN — HEPARIN SODIUM 5000 UNIT(S): 5000 INJECTION INTRAVENOUS; SUBCUTANEOUS at 14:10

## 2017-04-27 NOTE — PROGRESS NOTE ADULT - SUBJECTIVE AND OBJECTIVE BOX
Interventional Cardiology PA Adult Progress Note    Subjective Assessment: Pt seen and examined at bedside with daughter and Dr Albert. No complaints at this time.  	  MEDICATIONS:  verapamil 80milliGRAM(s) Oral every 12 hours  citalopram 10milliGRAM(s) Oral daily  OLANZapine 10milliGRAM(s) Oral daily  zolpidem 5milliGRAM(s) Oral at bedtime PRN  zolpidem 5milliGRAM(s) Oral at bedtime PRN  acetaminophen   Tablet. 650milliGRAM(s) Oral every 6 hours PRN  atorvastatin 20milliGRAM(s) Oral daily  chlorhexidine 4% Liquid 1Application(s) Topical once  sodium chloride 0.9%. 1000milliLiter(s) IV Continuous <Continuous>  aspirin enteric coated 81milliGRAM(s) Oral daily  influenza   Vaccine 0.5milliLiter(s) IntraMuscular once  sodium chloride 0.9%. 500milliLiter(s) IV Continuous <Continuous>  heparin  Injectable 5000Unit(s) SubCutaneous every 8 hours      	    [PHYSICAL EXAM:  TELEMETRY:  T(C): 36.2, Max: 37.3 (04-26 @ 21:37)  HR: 62 (60 - 81)  BP: 141/77 (102/59 - 141/77)  RR: 18 (16 - 18)  SpO2: 96% (96% - 96%)  Wt(kg): --  I&O's Summary    I & Os for current day (as of 27 Apr 2017 16:06)  =============================================  IN: 510 ml / OUT: 0 ml / NET: 510 ml    Height (cm): 152.4 (04-27 @ 12:35)  Weight (kg): 77.2 (04-27 @ 12:35)  BMI (kg/m2): 33.2 (04-27 @ 12:35)  BSA (m2): 1.74 (04-27 @ 12:35)  Travis:  Central/PICC/Mid Line:                                         Appearance: NAD, pleasant  Neck: Supple, - JVD; Carotid Bruit   Cardiovascular: +systolic murmur, Normal S1 S2, No JVD  Respiratory: Lungs clear to auscultation b/l, No Rales, Rhonchi, Wheezing	  Skin: No rashes, No ecchymoses, No cyanosis  Extremities: Normal range of motion, No clubbing, cyanosis or edema  Vascular: Peripheral pulses palpable 2+ bilaterally  Neurologic: Non-focal  Psychiatry: A & O x 3, Mood & affect appropriate      LABS:	 	  CARDIAC MARKERS:                 14.2   7.1   )-----------( 267      ( 27 Apr 2017 08:01 )             41.4     04-27    140  |  108  |  18  ----------------------------<  75  3.8   |  23  |  0.83    Ca    8.6      27 Apr 2017 08:01  Mg     2.2     04-27    TPro  7.4  /  Alb  3.3<L>  /  TBili  0.8  /  DBili  x   /  AST  21  /  ALT  30  /  AlkPhos  94  04-26

## 2017-04-27 NOTE — PROGRESS NOTE ADULT - SUBJECTIVE AND OBJECTIVE BOX
This 71 year-old female with PMH significant for HOCM and Mitral valvular disease have been visit by [facility] team.  Repeat TTE is recommended for further evaluate the LVOT.

## 2017-04-27 NOTE — PROGRESS NOTE ADULT - ASSESSMENT
70 y/o Upper sorbian speaking Female, with PMHx significant for hyperlipidemia, depression, who was admitted to  last week for near syncopal episode, was found to be bradycardic with HR of 48 as per son. Pt s/p R + L CATH (4/25/17) normal right sided pressures, hyperdynamic EF (70%) LV significant hypertrophy with clear obstruction of septa arteries in systole. Pt was admitted for management of HOCM and symptomatic bradycardia.

## 2017-04-28 LAB
ANION GAP SERPL CALC-SCNC: 11 MMOL/L — SIGNIFICANT CHANGE UP (ref 9–16)
BUN SERPL-MCNC: 13 MG/DL — SIGNIFICANT CHANGE UP (ref 7–23)
CALCIUM SERPL-MCNC: 9.4 MG/DL — SIGNIFICANT CHANGE UP (ref 8.5–10.5)
CHLORIDE SERPL-SCNC: 108 MMOL/L — SIGNIFICANT CHANGE UP (ref 96–108)
CO2 SERPL-SCNC: 22 MMOL/L — SIGNIFICANT CHANGE UP (ref 22–31)
CREAT SERPL-MCNC: 0.82 MG/DL — SIGNIFICANT CHANGE UP (ref 0.5–1.3)
GLUCOSE SERPL-MCNC: 90 MG/DL — SIGNIFICANT CHANGE UP (ref 70–99)
HCT VFR BLD CALC: 42.3 % — SIGNIFICANT CHANGE UP (ref 34.5–45)
HGB BLD-MCNC: 14.8 G/DL — SIGNIFICANT CHANGE UP (ref 11.5–15.5)
MAGNESIUM SERPL-MCNC: 2.4 MG/DL — SIGNIFICANT CHANGE UP (ref 1.6–2.4)
MCHC RBC-ENTMCNC: 29.2 PG — SIGNIFICANT CHANGE UP (ref 27–34)
MCHC RBC-ENTMCNC: 35 G/DL — SIGNIFICANT CHANGE UP (ref 32–36)
MCV RBC AUTO: 83.4 FL — SIGNIFICANT CHANGE UP (ref 80–100)
PLATELET # BLD AUTO: 278 K/UL — SIGNIFICANT CHANGE UP (ref 150–400)
POTASSIUM SERPL-MCNC: 4.1 MMOL/L — SIGNIFICANT CHANGE UP (ref 3.5–5.3)
POTASSIUM SERPL-SCNC: 4.1 MMOL/L — SIGNIFICANT CHANGE UP (ref 3.5–5.3)
RBC # BLD: 5.07 M/UL — SIGNIFICANT CHANGE UP (ref 3.8–5.2)
RBC # FLD: 14.4 % — SIGNIFICANT CHANGE UP (ref 10.3–16.9)
SODIUM SERPL-SCNC: 141 MMOL/L — SIGNIFICANT CHANGE UP (ref 135–145)
WBC # BLD: 7.2 K/UL — SIGNIFICANT CHANGE UP (ref 3.8–10.5)
WBC # FLD AUTO: 7.2 K/UL — SIGNIFICANT CHANGE UP (ref 3.8–10.5)

## 2017-04-28 PROCEDURE — 99233 SBSQ HOSP IP/OBS HIGH 50: CPT | Mod: 25

## 2017-04-28 PROCEDURE — 33249 INSJ/RPLCMT DEFIB W/LEAD(S): CPT | Mod: Q0

## 2017-04-28 PROCEDURE — 99233 SBSQ HOSP IP/OBS HIGH 50: CPT

## 2017-04-28 PROCEDURE — 71020: CPT | Mod: 26

## 2017-04-28 PROCEDURE — 93641 EP EVL 1/2CHMB PAC CVDFB TST: CPT | Mod: 26

## 2017-04-28 RX ORDER — VERAPAMIL HCL 240 MG
180 CAPSULE, EXTENDED RELEASE PELLETS 24 HR ORAL DAILY
Qty: 0 | Refills: 0 | Status: DISCONTINUED | OUTPATIENT
Start: 2017-04-29 | End: 2017-05-01

## 2017-04-28 RX ORDER — METOPROLOL TARTRATE 50 MG
25 TABLET ORAL DAILY
Qty: 0 | Refills: 0 | Status: DISCONTINUED | OUTPATIENT
Start: 2017-04-28 | End: 2017-04-28

## 2017-04-28 RX ORDER — VANCOMYCIN HCL 1 G
1000 VIAL (EA) INTRAVENOUS ONCE
Qty: 0 | Refills: 0 | Status: COMPLETED | OUTPATIENT
Start: 2017-04-28 | End: 2017-04-28

## 2017-04-28 RX ORDER — VANCOMYCIN HCL 1 G
1000 VIAL (EA) INTRAVENOUS ONCE
Qty: 0 | Refills: 0 | Status: DISCONTINUED | OUTPATIENT
Start: 2017-04-28 | End: 2017-05-01

## 2017-04-28 RX ORDER — VERAPAMIL HCL 240 MG
120 CAPSULE, EXTENDED RELEASE PELLETS 24 HR ORAL ONCE
Qty: 0 | Refills: 0 | Status: COMPLETED | OUTPATIENT
Start: 2017-04-28 | End: 2017-04-28

## 2017-04-28 RX ADMIN — OLANZAPINE 10 MILLIGRAM(S): 15 TABLET, FILM COATED ORAL at 11:48

## 2017-04-28 RX ADMIN — Medication 81 MILLIGRAM(S): at 11:48

## 2017-04-28 RX ADMIN — CITALOPRAM 10 MILLIGRAM(S): 10 TABLET, FILM COATED ORAL at 11:48

## 2017-04-28 RX ADMIN — Medication 80 MILLIGRAM(S): at 06:16

## 2017-04-28 RX ADMIN — HEPARIN SODIUM 5000 UNIT(S): 5000 INJECTION INTRAVENOUS; SUBCUTANEOUS at 14:04

## 2017-04-28 RX ADMIN — ATORVASTATIN CALCIUM 20 MILLIGRAM(S): 80 TABLET, FILM COATED ORAL at 22:56

## 2017-04-28 RX ADMIN — Medication 120 MILLIGRAM(S): at 11:47

## 2017-04-28 RX ADMIN — Medication 250 MILLIGRAM(S): at 22:56

## 2017-04-28 RX ADMIN — HEPARIN SODIUM 5000 UNIT(S): 5000 INJECTION INTRAVENOUS; SUBCUTANEOUS at 22:56

## 2017-04-28 NOTE — DIETITIAN INITIAL EVALUATION ADULT. - NS AS NUTRI INTERV ED CONTENT
Purpose of the nutrition education/f/u with pt son or daughter/Nutrition relationship to health/disease

## 2017-04-28 NOTE — PROGRESS NOTE ADULT - PROBLEM SELECTOR PLAN 1
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 Systolic anterior motion of the anterior mitral leaflet, causing left ventricular outflow obstruction. The peak LVOT gradient is 77 mmHg at a heart rate of 65 bpm. Mild pulm HTN.   -Verapamil increased to 180 mg Daily (ER). Additional 120 mg given as patient received 80 mg this AM. Consider adding Metoprolol Tartrate 25 mg PO BID over weekend if BP tolerates.   - Cardiac MRI (4/26/17) EF 62%, systolic anterior motion of the mitral valve, with   acceleration flow at the left ventricular tract, no evidence of myocardial scarring, maximum end-diastolic wall thickness is 16 mm at the basal anteroseptum.  - Dr Be consulted -recommends uptitrating medications CCB and BB if added and repeat echocardiogram Monday to evaluate gradient.   - Per CTS recommendations: TSH normal, Hemoglobin A1C 5.7%. Portable CXR 4/28/17 negative.   - s/p ICD 4/28/17 . Post procedure PA/Lateral CXR negative for Pneumothorax. 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 Systolic anterior motion of the anterior mitral leaflet, causing left ventricular outflow obstruction. The peak LVOT gradient is 77 mmHg at a heart rate of 65 bpm. Mild pulm HTN.   -Verapamil increased to 180 mg Daily (ER). Additional 120 mg given as patient received 80 mg this AM. Consider adding Metoprolol Tartrate 25 mg PO BID over weekend if BP tolerates.   - Cardiac MRI (4/26/17) EF 62%, systolic anterior motion of the mitral valve, with   acceleration flow at the left ventricular tract, no evidence of myocardial scarring, maximum end-diastolic wall thickness is 16 mm at the basal anteroseptum.  - Dr Be consulted -recommends uptitrating medications CCB and BB if added and repeat echocardiogram Monday to evaluate gradient.   - Per CTS recommendations: TSH normal, Hemoglobin A1C 5.7%. Portable CXR 4/28/17 negative.   - s/p ICD 4/28/17 .Post procedure PA/Lateral CXR negative for Pneumothorax.

## 2017-04-28 NOTE — PROGRESS NOTE ADULT - PROBLEM SELECTOR PLAN 1
Up titrate verapamil as tolerated.  Consider addition of BB if tolerated.  Repeat TTE on Monday to reevaluate LVOT gradient.

## 2017-04-28 NOTE — PROGRESS NOTE ADULT - SUBJECTIVE AND OBJECTIVE BOX
Monitored for syncope and arrhythmias o/n - no events.    VSS    NAD  No JVD   RRR + s1s2 + 4/6 systolic murmur   CTA b/l  soft + bs  1+ edema b/l      72 yo F with syncope in setting of CURTIS and HOCM (septum 1.7) PG 77 mmHg, s/p AICD today.     1- Increase verapamil to 180 ER qd. Give 120 ER today in addition to 80 mg dose that pt rec'd in am. Increase Verapamil or add on a bb if needed per EP recs over the weekend. Repeat limited TTE for gradients on med tx and post AICD on Monday. Anticipated d/c is Monday.

## 2017-04-28 NOTE — PROGRESS NOTE ADULT - ASSESSMENT
70 y/o Gibraltarian speaking Female, with PMHx of hyperlipidemia and depression, who was admitted for management of HOCM and symptomatic bradycardia.  Pt underwent uneventful placement of AICD today.  Daughter at bedside, states no complaints s/p procedure. imp: hypertrophic cardiomyopathy with CURTIS/LVOT obstruction with severe gradients; likely mild-moderate aortic stenosis concomittant (predominately LVOTO); chronic diastolic CHF; syncope; depression. s/p DC ICD. CMR with maximal septal thickness < 20mm - not candidate for EtOH septal ablation.  Plan:   -appreciate EPS recs  -titration of CCB  -liberal fluids  -repeat TTE on monday

## 2017-04-28 NOTE — PROGRESS NOTE ADULT - ASSESSMENT
70 y/o Setsw[place] speaking Female, with PMHx significant for hyperlipidemia, depression, who was admitted to  last week for near syncopal episode, was found to be bradycardic with HR of 48 as per son. Pt s/p R + L CATH (4/25/17) normal right sided pressures, hyperdynamic EF (70%) LV significant hypertrophy with clear obstruction of septa arteries in systole. Pt was admitted for management of HOCM and symptomatic bradycardia.

## 2017-04-28 NOTE — PROGRESS NOTE ADULT - PROBLEM SELECTOR PLAN 2
- EP consulted and following  - HR in 60s-80s, currently asymptomatic. - EP consulted and following  - HR in 60s-80s, no evidence of bradycardia on telemetry 4/28.

## 2017-04-28 NOTE — PROGRESS NOTE ADULT - PROBLEM SELECTOR PLAN 3
-Continue citalopram 10mg daily, Olanzapine 10mg daily. -Continue citalopram 10mg daily, Olanzapine 10mg daily.    DVT Prophylaxis: Heparin 5000 units SubQ q 8 hrs  Dispo: Patient currently stable s/p ICD. Continue uptitration of CCB and add BB if BP tolerates. Repeat Echocardiogram on Monday to re-evaluate LVOT gradient.

## 2017-04-28 NOTE — PROGRESS NOTE ADULT - SUBJECTIVE AND OBJECTIVE BOX
Interventional Cardiology PA Adult Progress Note    Subjective Assessment:  	  MEDICATIONS: Patient seen and examined at bedside and interviewed in [place]. Patient denies C/P, SOB, palpitations, dizziness, diaphoresis, N/V.     vancomycin  IVPB 1000milliGRAM(s) IV Intermittent once  vancomycin  IVPB 1000milliGRAM(s) IV Intermittent once  citalopram 10milliGRAM(s) Oral daily  OLANZapine 10milliGRAM(s) Oral daily  zolpidem 5milliGRAM(s) Oral at bedtime PRN  zolpidem 5milliGRAM(s) Oral at bedtime PRN  acetaminophen   Tablet. 650milliGRAM(s) Oral every 6 hours PRN      atorvastatin 20milliGRAM(s) Oral daily    chlorhexidine 4% Liquid 1Application(s) Topical once  sodium chloride 0.9%. 1000milliLiter(s) IV Continuous <Continuous>  aspirin enteric coated 81milliGRAM(s) Oral daily  sodium chloride 0.9%. 500milliLiter(s) IV Continuous <Continuous>  heparin  Injectable 5000Unit(s) SubCutaneous every 8 hours      	    [PHYSICAL EXAM:  TELEMETRY:   T(C): 37.4, Max: 37.4 (04-28 @ 15:39)  HR: 86 (60 - 86)  BP: 119/86 (119/86 - 136/70)  RR: 18 (18 - 20)  SpO2: 93% (92% - 98%) RA  Wt(kg):  Height (cm): 152.4 (04-28 @ 06:59)  Weight (kg): 77.2 (04-28 @ 06:59)  BMI (kg/m2): 33.2 (04-28 @ 06:59)  BSA (m2): 1.74 (04-28 @ 06:59)    Travis: No  Central/PICC/Mid Line: No                                        Appearance: Normal	  HEENT:   Normal oral mucosa, PERRL, EOMI	  Neck: Supple. No JVD.   Cardiovascular: + S1 S2. RRR. 3/6 Systolic Murmur. Left Upper Chest Wall-ICD with overlying pressure bandage.   Respiratory: Lungs clear to auscultation/Decreased Breath Sounds/No Rales, Rhonchi, Wheezing	  Gastrointestinal:  Soft, Non-tender, + BS	  Skin: No rashes, No ecchymoses, No cyanosis  Extremities: Normal range of motion, No clubbing, cyanosis or edema  Vascular: Peripheral pulses palpable 2+ bilaterally  Neurologic: Non-focal  Psychiatry: A & O x 3, Mood & affect appropriate      	    ECG:  	  RADIOLOGY:   DIAGNOSTIC TESTING:  [ ] Echocardiogram:  [ ]  Catheterization:  [ ] Stress Test:    [ ] SWETHA  OTHER: 	    LABS:	 	  CARDIAC MARKERS:                                  14.8   7.2   )-----------( 278      ( 28 Apr 2017 06:57 )             42.3     04-28    141  |  108  |  13  ----------------------------<  90  4.1   |  22  |  0.82    Ca    9.4      28 Apr 2017 06:57  Mg     2.4     04-28      proBNP:   Lipid Profile:   HgA1c:   TSH:       ASSESSMENT/PLAN: 	        DVT ppx:  Dispo: Interventional Cardiology PA Adult Progress Note    Subjective Assessment:  	  MEDICATIONS: Patient seen and examined at bedside and interviewed in Ecuadorean. Patient denies C/P, SOB, palpitations, dizziness, diaphoresis, N/V.     vancomycin  IVPB 1000milliGRAM(s) IV Intermittent once  vancomycin  IVPB 1000milliGRAM(s) IV Intermittent once  citalopram 10milliGRAM(s) Oral daily  OLANZapine 10milliGRAM(s) Oral daily  zolpidem 5milliGRAM(s) Oral at bedtime PRN  zolpidem 5milliGRAM(s) Oral at bedtime PRN  acetaminophen   Tablet. 650milliGRAM(s) Oral every 6 hours PRN      atorvastatin 20milliGRAM(s) Oral daily    chlorhexidine 4% Liquid 1Application(s) Topical once  sodium chloride 0.9%. 1000milliLiter(s) IV Continuous <Continuous>  aspirin enteric coated 81milliGRAM(s) Oral daily  sodium chloride 0.9%. 500milliLiter(s) IV Continuous <Continuous>  heparin  Injectable 5000Unit(s) SubCutaneous every 8 hours      [PHYSICAL EXAM:  TELEMETRY:   T(C): 37.4, Max: 37.4 (04-28 @ 15:39)  HR: 86 (60 - 86)  BP: 119/86 (119/86 - 136/70)  RR: 18 (18 - 20)  SpO2: 93% (92% - 98%) RA  Wt(kg):  Height (cm): 152.4 (04-28 @ 06:59)  Weight (kg): 77.2 (04-28 @ 06:59)  BMI (kg/m2): 33.2 (04-28 @ 06:59)  BSA (m2): 1.74 (04-28 @ 06:59)    Travis: No  Central/PICC/Mid Line: No                                        Appearance: Normal	  HEENT:   Normal oral mucosa, PERRL, EOMI	  Neck: Supple. No JVD.   Cardiovascular: + S1 S2. RRR. 3/6 Systolic Murmur. Left Upper Chest Wall-ICD with overlying pressure bandage.   Respiratory: Lungs clear to auscultation/Decreased Breath Sounds/No Rales, Rhonchi, Wheezing	  Gastrointestinal:  Obese. BS x 4. Soft NT/ND   Skin: No rashes, No ecchymoses, No cyanosis  Extremities: Normal range of motion. 1+ pitting edema BLE .   Right Radial: Radial Pulse 2+. No hematoma or bleed. Sensation Intact. 5/5  Strength.  Right Brachial; No hematoma or bleed.   Neurologic: Non-focal  Psychiatry: A & O x 3, Mood & affect appropriate  	    ECG:  	  RADIOLOGY:   DIAGNOSTIC TESTING:  [ ] Echocardiogram:  [ ]  Catheterization:  [ ] Stress Test:    [ ] SWETHA  OTHER: Cardiac MRI 4/25/17: 	                      14.8 E. Other Findings  1. A few probable small hepatic cysts.  2. Probable left renal cyst, measuring approximately 2.5 x 2.3 cm    V.INTERPRETATION:   1.  Left ventricular hypertrophy involving the basal and mid ventricular   anterior wall and anteroseptum, in the mid ventricular anterolateral   wall. The maximum end-diastolic wall thickness is 16 mm at the basal   anteroseptum.  2.  There is systolic anterior motion of the mitral valve, with   acceleration flow at the left ventricular tract.  3.  Trace mitral regurgitation is noted.  4.  The left ventricle (LV) is  normal in size .   Left ventricular   global systolic function is  normal . The LV ejection fraction is   62 %.   5.  The right ventricle (RV) is  normal in size .  RV global systolic   function is  normal . The RV ejection fraction is 64 %.    6.  On delayed enhancement imaging,  there is no evidence of myocardial   scarring .    7.  No significant abnormalities of the visualized portions of the great   vessels .  8.  Probable hepatic and left renal cysts.   9.  There is no hemodynamically significant shunt.    LABS:	 	  CARDIAC MARKERS:    7.2   )-----------( 278      ( 28 Apr 2017 06:57 )             42.3     04-28    141  |  108  |  13  ----------------------------<  90  4.1   |  22  |  0.82    Ca    9.4      28 Apr 2017 06:57  Mg     2.4     04-28      proBNP:   Lipid Profile:   HgA1c:   TSH: Interventional Cardiology PA Adult Progress Note    Subjective Assessment:  	  MEDICATIONS: Patient seen and examined at bedside and interviewed in [place]ammarinese. Patient denies C/P, SOB, palpitations, dizziness, diaphoresis, N/V.     vancomycin  IVPB 1000milliGRAM(s) IV Intermittent once  vancomycin  IVPB 1000milliGRAM(s) IV Intermittent once  citalopram 10milliGRAM(s) Oral daily  OLANZapine 10milliGRAM(s) Oral daily  zolpidem 5milliGRAM(s) Oral at bedtime PRN  zolpidem 5milliGRAM(s) Oral at bedtime PRN  acetaminophen   Tablet. 650milliGRAM(s) Oral every 6 hours PRN      atorvastatin 20milliGRAM(s) Oral daily    chlorhexidine 4% Liquid 1Application(s) Topical once  sodium chloride 0.9%. 1000milliLiter(s) IV Continuous <Continuous>  aspirin enteric coated 81milliGRAM(s) Oral daily  sodium chloride 0.9%. 500milliLiter(s) IV Continuous <Continuous>  heparin  Injectable 5000Unit(s) SubCutaneous every 8 hours      [PHYSICAL EXAM:  TELEMETRY:   T(C): 37.4, Max: 37.4 (04-28 @ 15:39)  HR: 86 (60 - 86)  BP: 119/86 (119/86 - 136/70)  RR: 18 (18 - 20)  SpO2: 93% (92% - 98%) RA  Wt(kg):  Height (cm): 152.4 (04-28 @ 06:59)  Weight (kg): 77.2 (04-28 @ 06:59)  BMI (kg/m2): 33.2 (04-28 @ 06:59)  BSA (m2): 1.74 (04-28 @ 06:59)    Travis: No  Central/PICC/Mid Line: No                                        Appearance: Normal	  HEENT:   Normal oral mucosa, PERRL, EOMI	  Neck: Supple. No JVD.   Cardiovascular: + S1 S2. RRR. 3/6 Systolic Murmur. Left Upper Chest Wall-ICD with overlying pressure bandage. No drainage, bleed or surrounding erythema.  Respiratory: Lungs clear to auscultation/Decreased Breath Sounds/No Rales, Rhonchi, Wheezing	  Gastrointestinal:  Obese. BS x 4. Soft NT/ND   Skin: No rashes, No ecchymoses, No cyanosis  Extremities: Normal range of motion. 1+ pitting edema BLE .   Right Radial: Radial Pulse 2+. No hematoma or bleed. Sensation Intact. 5/5  Strength.  Right Brachial; No hematoma or bleed.   Neurologic: Non-focal  Psychiatry: A & O x 3, Mood & affect appropriate  	    ECG:  	  RADIOLOGY:   DIAGNOSTIC TESTING:  [ ] Echocardiogram:  [ ]  Catheterization:  [ ] Stress Test:    [ ] SWETHA  OTHER: Cardiac MRI 4/25/17: 	                      14.8 E. Other Findings  1. A few probable small hepatic cysts.  2. Probable left renal cyst, measuring approximately 2.5 x 2.3 cm     1.  Left ventricular hypertrophy involving the basal and mid ventricular   anterior wall and anteroseptum, in the mid ventricular anterolateral   wall. The maximum end-diastolic wall thickness is 16 mm at the basal   anteroseptum.  2.  There is systolic anterior motion of the mitral valve, with   acceleration flow at the left ventricular tract.  3.  Trace mitral regurgitation is noted.  4.  The left ventricle (LV) is  normal in size .   Left ventricular   global systolic function is  normal . The LV ejection fraction is   62 %.   5.  The right ventricle (RV) is  normal in size .  RV global systolic   function is  normal . The RV ejection fraction is 64 %.    6.  On delayed enhancement imaging,  there is no evidence of myocardial   scarring .    7.  No significant abnormalities of the visualized portions of the great   vessels .  8.  Probable hepatic and left renal cysts.   9.  There is no hemodynamically significant shunt.    LABS:	 	  CARDIAC MARKERS:    7.2   )-----------( 278      ( 28 Apr 2017 06:57 )             42.3     04-28    141  |  108  |  13  ----------------------------<  90  4.1   |  22  |  0.82    Ca    9.4      28 Apr 2017 06:57  Mg     2.4     04-28      proBNP:   Lipid Profile:   HgA1c:   TSH:

## 2017-04-28 NOTE — PROGRESS NOTE ADULT - SUBJECTIVE AND OBJECTIVE BOX
Patient seen and examined at bedside.          72 y/o Vietnamese speaking Female, with PMHx of hyperlipidemia and depression, who was admitted for management of HOCM and symptomatic bradycardia.  Pt underwent uneventful placement of AICD today.  Daughter at bedside, states no complaints s/p procedure.        Vital Signs Last 24 Hrs  T(C): 37.4, Max: 37.4 (04-28 @ 15:39)  T(F): 99.3, Max: 99.3 (04-28 @ 15:39)  HR: 82 (60 - 86)  BP: 117/60 (117/60 - 136/70)  BP(mean): --  RR: 16 (16 - 20)  SpO2: 93% (92% - 98%)  I&O's Detail      PHYSICAL EXAM:  General: Vietnamese speaking only but daughter at bedside during exam    Neurological: Nonfocal.  AxOx3    Cardiovascular: S1, S2, RRR, III/VI systolic murmur.  Left anterior chest wall pressure bandage in place and dry.    Respiratory:CTA b/l    Gastrointestinal:obese, soft, NT/ND    Extremities: warm.    LABS:                        14.8   7.2   )-----------( 278      ( 28 Apr 2017 06:57 )             42.3     04-28    141  |  108  |  13  ----------------------------<  90  4.1   |  22  |  0.82    Ca    9.4      28 Apr 2017 06:57  Mg     2.4     04-28      MEDICATIONS  (STANDING):  aspirin enteric coated 81milliGRAM(s) Oral daily  citalopram 10milliGRAM(s) Oral daily  atorvastatin 20milliGRAM(s) Oral daily  OLANZapine 10milliGRAM(s) Oral daily  heparin  Injectable 5000Unit(s) SubCutaneous every 8 hours  vancomycin  IVPB 1000milliGRAM(s) IV Intermittent once  vancomycin  IVPB 1000milliGRAM(s) IV Intermittent once    MEDICATIONS  (PRN):  zolpidem 5milliGRAM(s) Oral at bedtime PRN Insomnia  zolpidem 5milliGRAM(s) Oral at bedtime PRN Insomnia  acetaminophen   Tablet. 650milliGRAM(s) Oral every 6 hours PRN Mild Pain (1 - 3)        RADIOLOGY & ADDITIONAL TESTS:  PROCEDURE DATE:  04/28/2017     INTERPRETATION:    Chest x-ray    Indication: Hypertrophic cardiomyopathy.. Status post pacemaker placement.    Prior studies: None    PA and lateral views of the chest are submitted. ICD electrodes are seen   overlying the right atrium and right ventricle. Heart size is within   normal limits.  No pulmonary consolidation is seen.  No pleural effusion   or pneumothorax is identified.    Impression:No pneumothorax. Patient seen and examined at bedside.     72 y/o Swazi speaking Female, with PMHx of hyperlipidemia and depression, who was admitted for management of HOCM and symptomatic bradycardia.  Pt underwent uneventful placement of AICD today.  Daughter at bedside, states no complaints s/p procedure.        Vital Signs Last 24 Hrs  T(C): 37.4, Max: 37.4 (04-28 @ 15:39)  T(F): 99.3, Max: 99.3 (04-28 @ 15:39)  HR: 82 (60 - 86)  BP: 117/60 (117/60 - 136/70)  BP(mean): --  RR: 16 (16 - 20)  SpO2: 93% (92% - 98%)  I&O's Detail      PHYSICAL EXAM:  General: Swazi speaking only but daughter at bedside during exam    Neurological: Nonfocal.  AxOx3    Cardiovascular: S1, S2, RRR, III/VI systolic murmur.  Left anterior chest wall pressure bandage in place and dry.    Respiratory:CTA b/l    Gastrointestinal:obese, soft, NT/ND    Extremities: warm.    LABS:                        14.8   7.2   )-----------( 278      ( 28 Apr 2017 06:57 )             42.3     04-28    141  |  108  |  13  ----------------------------<  90  4.1   |  22  |  0.82    Ca    9.4      28 Apr 2017 06:57  Mg     2.4     04-28      MEDICATIONS  (STANDING):  aspirin enteric coated 81milliGRAM(s) Oral daily  citalopram 10milliGRAM(s) Oral daily  atorvastatin 20milliGRAM(s) Oral daily  OLANZapine 10milliGRAM(s) Oral daily  heparin  Injectable 5000Unit(s) SubCutaneous every 8 hours  vancomycin  IVPB 1000milliGRAM(s) IV Intermittent once  vancomycin  IVPB 1000milliGRAM(s) IV Intermittent once    MEDICATIONS  (PRN):  zolpidem 5milliGRAM(s) Oral at bedtime PRN Insomnia  zolpidem 5milliGRAM(s) Oral at bedtime PRN Insomnia  acetaminophen   Tablet. 650milliGRAM(s) Oral every 6 hours PRN Mild Pain (1 - 3)        RADIOLOGY & ADDITIONAL TESTS:  PROCEDURE DATE:  04/28/2017     INTERPRETATION:    Chest x-ray    Indication: Hypertrophic cardiomyopathy.. Status post pacemaker placement.    Prior studies: None    PA and lateral views of the chest are submitted. ICD electrodes are seen   overlying the right atrium and right ventricle. Heart size is within   normal limits.  No pulmonary consolidation is seen.  No pleural effusion   or pneumothorax is identified.    Impression:No pneumothorax.

## 2017-04-28 NOTE — DIETITIAN INITIAL EVALUATION ADULT. - OTHER INFO
Risk - moderate , assisted with tray set up skin - intact , left and rt leg edema , + depression  as  per chart , no food allergys , no pain ; no n/v/d/c , weight hx to f/u

## 2017-04-29 LAB
ANION GAP SERPL CALC-SCNC: 10 MMOL/L — SIGNIFICANT CHANGE UP (ref 9–16)
BUN SERPL-MCNC: 17 MG/DL — SIGNIFICANT CHANGE UP (ref 7–23)
CALCIUM SERPL-MCNC: 9.1 MG/DL — SIGNIFICANT CHANGE UP (ref 8.5–10.5)
CHLORIDE SERPL-SCNC: 105 MMOL/L — SIGNIFICANT CHANGE UP (ref 96–108)
CO2 SERPL-SCNC: 23 MMOL/L — SIGNIFICANT CHANGE UP (ref 22–31)
CREAT SERPL-MCNC: 0.79 MG/DL — SIGNIFICANT CHANGE UP (ref 0.5–1.3)
GLUCOSE SERPL-MCNC: 99 MG/DL — SIGNIFICANT CHANGE UP (ref 70–99)
HCT VFR BLD CALC: 39.5 % — SIGNIFICANT CHANGE UP (ref 34.5–45)
HGB BLD-MCNC: 13.4 G/DL — SIGNIFICANT CHANGE UP (ref 11.5–15.5)
MAGNESIUM SERPL-MCNC: 2.1 MG/DL — SIGNIFICANT CHANGE UP (ref 1.6–2.4)
MCHC RBC-ENTMCNC: 28.5 PG — SIGNIFICANT CHANGE UP (ref 27–34)
MCHC RBC-ENTMCNC: 33.9 G/DL — SIGNIFICANT CHANGE UP (ref 32–36)
MCV RBC AUTO: 83.9 FL — SIGNIFICANT CHANGE UP (ref 80–100)
PLATELET # BLD AUTO: 234 K/UL — SIGNIFICANT CHANGE UP (ref 150–400)
POTASSIUM SERPL-MCNC: 3.7 MMOL/L — SIGNIFICANT CHANGE UP (ref 3.5–5.3)
POTASSIUM SERPL-SCNC: 3.7 MMOL/L — SIGNIFICANT CHANGE UP (ref 3.5–5.3)
RBC # BLD: 4.71 M/UL — SIGNIFICANT CHANGE UP (ref 3.8–5.2)
RBC # FLD: 14.2 % — SIGNIFICANT CHANGE UP (ref 10.3–16.9)
SODIUM SERPL-SCNC: 138 MMOL/L — SIGNIFICANT CHANGE UP (ref 135–145)
WBC # BLD: 6.9 K/UL — SIGNIFICANT CHANGE UP (ref 3.8–10.5)
WBC # FLD AUTO: 6.9 K/UL — SIGNIFICANT CHANGE UP (ref 3.8–10.5)

## 2017-04-29 PROCEDURE — 93306 TTE W/DOPPLER COMPLETE: CPT | Mod: 26

## 2017-04-29 PROCEDURE — 99233 SBSQ HOSP IP/OBS HIGH 50: CPT

## 2017-04-29 RX ORDER — METOPROLOL TARTRATE 50 MG
25 TABLET ORAL
Qty: 0 | Refills: 0 | Status: DISCONTINUED | OUTPATIENT
Start: 2017-04-29 | End: 2017-05-01

## 2017-04-29 RX ORDER — METOPROLOL TARTRATE 50 MG
50 TABLET ORAL
Qty: 0 | Refills: 0 | Status: DISCONTINUED | OUTPATIENT
Start: 2017-04-29 | End: 2017-04-29

## 2017-04-29 RX ORDER — BACITRACIN ZINC 500 UNIT/G
1 OINTMENT IN PACKET (EA) TOPICAL
Qty: 0 | Refills: 0 | Status: DISCONTINUED | OUTPATIENT
Start: 2017-04-29 | End: 2017-05-01

## 2017-04-29 RX ORDER — POTASSIUM CHLORIDE 20 MEQ
40 PACKET (EA) ORAL ONCE
Qty: 0 | Refills: 0 | Status: COMPLETED | OUTPATIENT
Start: 2017-04-29 | End: 2017-04-29

## 2017-04-29 RX ADMIN — HEPARIN SODIUM 5000 UNIT(S): 5000 INJECTION INTRAVENOUS; SUBCUTANEOUS at 14:51

## 2017-04-29 RX ADMIN — Medication 25 MILLIGRAM(S): at 17:34

## 2017-04-29 RX ADMIN — ATORVASTATIN CALCIUM 20 MILLIGRAM(S): 80 TABLET, FILM COATED ORAL at 22:21

## 2017-04-29 RX ADMIN — ZOLPIDEM TARTRATE 5 MILLIGRAM(S): 10 TABLET ORAL at 22:21

## 2017-04-29 RX ADMIN — Medication 180 MILLIGRAM(S): at 10:38

## 2017-04-29 RX ADMIN — OLANZAPINE 10 MILLIGRAM(S): 15 TABLET, FILM COATED ORAL at 10:38

## 2017-04-29 RX ADMIN — CITALOPRAM 10 MILLIGRAM(S): 10 TABLET, FILM COATED ORAL at 10:38

## 2017-04-29 RX ADMIN — Medication 40 MILLIEQUIVALENT(S): at 10:39

## 2017-04-29 RX ADMIN — HEPARIN SODIUM 5000 UNIT(S): 5000 INJECTION INTRAVENOUS; SUBCUTANEOUS at 22:21

## 2017-04-29 RX ADMIN — HEPARIN SODIUM 5000 UNIT(S): 5000 INJECTION INTRAVENOUS; SUBCUTANEOUS at 05:33

## 2017-04-29 RX ADMIN — Medication 81 MILLIGRAM(S): at 10:38

## 2017-04-29 RX ADMIN — Medication 1 APPLICATION(S): at 22:20

## 2017-04-29 NOTE — PROGRESS NOTE ADULT - ASSESSMENT
70 y/o Albanian speaking Female, with PMHx significant for hyperlipidemia, depression, who was admitted to  last week for near syncopal episode, was found to be bradycardic with HR of 48 as per son. Pt s/p R + L CATH (4/25/17) normal right sided pressures, hyperdynamic EF (70%) LV significant hypertrophy with clear obstruction of septa arteries in systole. Pt was admitted for management of HOCM and symptomatic bradycardia.

## 2017-04-29 NOTE — PROGRESS NOTE ADULT - SUBJECTIVE AND OBJECTIVE BOX
Interventional Cardiology PA Adult Progress Note    Subjective Assessment: Pt seen, examined and found   	  MEDICATIONS:  verapamil SR 180milliGRAM(s) Oral daily  metoprolol 25milliGRAM(s) Oral two times a day  vancomycin  IVPB 1000milliGRAM(s) IV Intermittent once    citalopram 10milliGRAM(s) Oral daily  OLANZapine 10milliGRAM(s) Oral daily  zolpidem 5milliGRAM(s) Oral at bedtime PRN  zolpidem 5milliGRAM(s) Oral at bedtime PRN  acetaminophen   Tablet. 650milliGRAM(s) Oral every 6 hours PRN  atorvastatin 20milliGRAM(s) Oral daily  chlorhexidine 4% Liquid 1Application(s) Topical once  sodium chloride 0.9%. 1000milliLiter(s) IV Continuous <Continuous>  aspirin enteric coated 81milliGRAM(s) Oral daily  sodium chloride 0.9%. 500milliLiter(s) IV Continuous <Continuous>  heparin  Injectable 5000Unit(s) SubCutaneous every 8 hours      	    [PHYSICAL EXAM:  TELEMETRY: NSR  T(C): 36.9, Max: 37.7 (04-28 @ 20:41)  HR: 88 (72 - 96)  BP: 116/61 (102/61 - 118/77)  RR: 16 (16 - 16)  SpO2: 95% (93% - 95%)  Wt(kg): --  I&O's Summary    I & Os for current day (as of 29 Apr 2017 14:11)  =============================================  IN: 358 ml / OUT: 0 ml / NET: 358 ml      Travis:  Central/PICC/Mid Line:                                         Appearance: Normal	  Neck: Supple,  - JVD; -Carotid Bruit   Cardiovascular: Normal S1 S2, No JVD, No murmurs, ICD in Left Upper Chest, dressing intact.  Respiratory: Lungs clear to auscultation.  No Rales, Rhonchi, Wheezing	  Gastrointestinal:  Soft, Non-tender, + BS	  Skin: No rashes, No ecchymoses, No cyanosis  Extremities: Normal range of motion, No clubbing, cyanosis or edema  Vascular: Peripheral pulses palpable 2+ bilaterally  Neurologic: Non-focal  Psychiatry: A & O x 3, Mood & affect appropriate    LABS:	 	  CARDIAC MARKERS:                        13.4   6.9   )-----------( 234      ( 29 Apr 2017 07:12 )             39.5     04-29    138  |  105  |  17  ----------------------------<  99  3.7   |  23  |  0.79    Ca    9.1      29 Apr 2017 07:12  Mg     2.1     04-29

## 2017-04-29 NOTE — PROGRESS NOTE ADULT - PROBLEM SELECTOR PLAN 1
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 Systolic anterior motion of the anterior mitral leaflet, causing left ventricular outflow obstruction. The peak LVOT gradient is 77 mmHg at a heart rate of 65 bpm. Mild pulm HTN.   -Continue Verapamil  180 mg Daily (ER) and Metoprolol Tartrate 25 mg PO BID per  EP  -Cardiac MRI (4/26/17) EF 62%, systolic anterior motion of the mitral valve, with   acceleration flow at the left ventricular tract, no evidence of myocardial scarring, maximum end-diastolic wall thickness is 16 mm at the basal anteroseptum.  - Dr Be consulted -recommends uptitrating medications CCB and BB if added and   repeat echocardiogram Monday to evaluate gradient.   - Per CTS recommendations: TSH normal, Hemoglobin A1C 5.7%. Portable CXR 4/28/17 negative.   - s/p ICD 4/28/17 .Post procedure PA/Lateral CXR negative for Pneumothorax.

## 2017-04-29 NOTE — PROGRESS NOTE ADULT - PROBLEM SELECTOR PLAN 3
-Continue citalopram 10mg daily, Olanzapine 10mg daily.    DVT Prophylaxis: Heparin 5000 units SubQ q 8 hrs  Dispo: Patient currently stable s/p ICD. Continue uptitration of CCB and add BB if BP tolerates. Repeat Echocardiogram on Monday to re-evaluate LVOT gradient. -Continue citalopram 10mg daily, Olanzapine 10mg daily.    DVT Prophylaxis: Heparin 5000 units SubQ q 8 hrs.    Dispo: Patient currently stable s/p ICD.  Added BB.  Continue uptitration of CCB if BP tolerates. Repeat Echocardiogram on Monday to re-evaluate LVOT gradient.

## 2017-04-30 DIAGNOSIS — E78.5 HYPERLIPIDEMIA, UNSPECIFIED: ICD-10-CM

## 2017-04-30 LAB
ANION GAP SERPL CALC-SCNC: 8 MMOL/L — LOW (ref 9–16)
BUN SERPL-MCNC: 16 MG/DL — SIGNIFICANT CHANGE UP (ref 7–23)
CALCIUM SERPL-MCNC: 9.1 MG/DL — SIGNIFICANT CHANGE UP (ref 8.5–10.5)
CHLORIDE SERPL-SCNC: 105 MMOL/L — SIGNIFICANT CHANGE UP (ref 96–108)
CO2 SERPL-SCNC: 25 MMOL/L — SIGNIFICANT CHANGE UP (ref 22–31)
CREAT SERPL-MCNC: 0.78 MG/DL — SIGNIFICANT CHANGE UP (ref 0.5–1.3)
GLUCOSE SERPL-MCNC: 83 MG/DL — SIGNIFICANT CHANGE UP (ref 70–99)
HCT VFR BLD CALC: 39.3 % — SIGNIFICANT CHANGE UP (ref 34.5–45)
HGB BLD-MCNC: 13.4 G/DL — SIGNIFICANT CHANGE UP (ref 11.5–15.5)
MAGNESIUM SERPL-MCNC: 2.3 MG/DL — SIGNIFICANT CHANGE UP (ref 1.6–2.4)
MCHC RBC-ENTMCNC: 28.8 PG — SIGNIFICANT CHANGE UP (ref 27–34)
MCHC RBC-ENTMCNC: 34.1 G/DL — SIGNIFICANT CHANGE UP (ref 32–36)
MCV RBC AUTO: 84.3 FL — SIGNIFICANT CHANGE UP (ref 80–100)
PLATELET # BLD AUTO: 223 K/UL — SIGNIFICANT CHANGE UP (ref 150–400)
POTASSIUM SERPL-MCNC: 4 MMOL/L — SIGNIFICANT CHANGE UP (ref 3.5–5.3)
POTASSIUM SERPL-SCNC: 4 MMOL/L — SIGNIFICANT CHANGE UP (ref 3.5–5.3)
RBC # BLD: 4.66 M/UL — SIGNIFICANT CHANGE UP (ref 3.8–5.2)
RBC # FLD: 14.2 % — SIGNIFICANT CHANGE UP (ref 10.3–16.9)
SODIUM SERPL-SCNC: 138 MMOL/L — SIGNIFICANT CHANGE UP (ref 135–145)
WBC # BLD: 8.2 K/UL — SIGNIFICANT CHANGE UP (ref 3.8–10.5)
WBC # FLD AUTO: 8.2 K/UL — SIGNIFICANT CHANGE UP (ref 3.8–10.5)

## 2017-04-30 PROCEDURE — 99233 SBSQ HOSP IP/OBS HIGH 50: CPT

## 2017-04-30 RX ORDER — DOCUSATE SODIUM 100 MG
100 CAPSULE ORAL DAILY
Qty: 0 | Refills: 0 | Status: DISCONTINUED | OUTPATIENT
Start: 2017-04-30 | End: 2017-05-01

## 2017-04-30 RX ORDER — SENNA PLUS 8.6 MG/1
2 TABLET ORAL DAILY
Qty: 0 | Refills: 0 | Status: DISCONTINUED | OUTPATIENT
Start: 2017-04-30 | End: 2017-05-01

## 2017-04-30 RX ADMIN — Medication 25 MILLIGRAM(S): at 06:45

## 2017-04-30 RX ADMIN — Medication 180 MILLIGRAM(S): at 06:45

## 2017-04-30 RX ADMIN — HEPARIN SODIUM 5000 UNIT(S): 5000 INJECTION INTRAVENOUS; SUBCUTANEOUS at 06:46

## 2017-04-30 RX ADMIN — ZOLPIDEM TARTRATE 5 MILLIGRAM(S): 10 TABLET ORAL at 22:38

## 2017-04-30 RX ADMIN — ATORVASTATIN CALCIUM 20 MILLIGRAM(S): 80 TABLET, FILM COATED ORAL at 22:37

## 2017-04-30 RX ADMIN — HEPARIN SODIUM 5000 UNIT(S): 5000 INJECTION INTRAVENOUS; SUBCUTANEOUS at 22:37

## 2017-04-30 RX ADMIN — CITALOPRAM 10 MILLIGRAM(S): 10 TABLET, FILM COATED ORAL at 11:44

## 2017-04-30 RX ADMIN — Medication 1 APPLICATION(S): at 17:14

## 2017-04-30 RX ADMIN — Medication 100 MILLIGRAM(S): at 11:44

## 2017-04-30 RX ADMIN — OLANZAPINE 10 MILLIGRAM(S): 15 TABLET, FILM COATED ORAL at 11:47

## 2017-04-30 RX ADMIN — SENNA PLUS 2 TABLET(S): 8.6 TABLET ORAL at 11:44

## 2017-04-30 RX ADMIN — Medication 1 APPLICATION(S): at 06:45

## 2017-04-30 RX ADMIN — HEPARIN SODIUM 5000 UNIT(S): 5000 INJECTION INTRAVENOUS; SUBCUTANEOUS at 14:52

## 2017-04-30 RX ADMIN — Medication 81 MILLIGRAM(S): at 11:44

## 2017-04-30 RX ADMIN — Medication 25 MILLIGRAM(S): at 17:13

## 2017-04-30 NOTE — PROGRESS NOTE ADULT - PROVIDER SPECIALTY LIST ADULT
CT Surgery
Cardiology
Intervent Cardiology
Intervent Cardiology
CT Surgery

## 2017-04-30 NOTE — PROGRESS NOTE ADULT - SUBJECTIVE AND OBJECTIVE BOX
SUBJECTIVE ASSESSMENT:  Patient seen this afternoon at bedside, doing well and not offering any complaints at this time. Denies any chest pain, shortness of breath, palpitations or abdominal pain.     Vital Signs Last 24 Hrs  T(C): 36.4, Max: 36.4 (04-30 @ 16:22)  T(F): 97.5, Max: 97.5 (04-30 @ 16:22)  HR: 75 (70 - 77)  BP: 102/57 (91/53 - 107/51)  BP(mean): --  RR: 16 (16 - 17)  SpO2: 96% (93% - 96%)  I&O's Detail  I & Os for 24h ending 30 Apr 2017 07:00  =============================================  IN:    Oral Fluid: 1138 ml    Total IN: 1138 ml  ---------------------------------------------  OUT:    Total OUT: 0 ml  ---------------------------------------------  Total NET: 1138 ml    I & Os for current day (as of 30 Apr 2017 17:57)  =============================================  IN:    Oral Fluid: 400 ml    Total IN: 400 ml  ---------------------------------------------  OUT:    Total OUT: 0 ml  ---------------------------------------------  Total NET: 400 ml    PHYSICAL EXAM:    General: Patient lying comfortably in bed, no acute distress     Neurological: Alert and oriented. No focal neurological deficits     Cardiovascular: S1S2, RRR, Grade II systolic murmur appreciated at LUSB     Respiratory: Clear to ausculation bilaterally     Gastrointestinal: Abdomen soft, non tender, non distended     Extremities: Warm and well perfused. No edema or calf tenderness     Vascular: Peripheral pulses 2+ bilaterally     Incision Sites: PPM incision C/D/I, mild swelling around site.     LABS:                        13.4   8.2   )-----------( 223      ( 30 Apr 2017 07:40 )             39.3     04-30    138  |  105  |  16  ----------------------------<  83  4.0   |  25  |  0.78    Ca    9.1      30 Apr 2017 07:40  Mg     2.3     04-30      MEDICATIONS  (STANDING):  chlorhexidine 4% Liquid 1Application(s) Topical once  sodium chloride 0.9%. 1000milliLiter(s) IV Continuous <Continuous>  aspirin enteric coated 81milliGRAM(s) Oral daily  citalopram 10milliGRAM(s) Oral daily  atorvastatin 20milliGRAM(s) Oral daily  OLANZapine 10milliGRAM(s) Oral daily  sodium chloride 0.9%. 500milliLiter(s) IV Continuous <Continuous>  heparin  Injectable 5000Unit(s) SubCutaneous every 8 hours  verapamil SR 180milliGRAM(s) Oral daily  vancomycin  IVPB 1000milliGRAM(s) IV Intermittent once  metoprolol 25milliGRAM(s) Oral two times a day  BACItracin   Ointment 1Application(s) Topical two times a day  senna 2Tablet(s) Oral daily  docusate sodium 100milliGRAM(s) Oral daily    MEDICATIONS  (PRN):  zolpidem 5milliGRAM(s) Oral at bedtime PRN Insomnia  zolpidem 5milliGRAM(s) Oral at bedtime PRN Insomnia  acetaminophen   Tablet. 650milliGRAM(s) Oral every 6 hours PRN Mild Pain (1 - 3)    A/P: 72 y/o Vietnamese speaking Female, with PMHx of hyperlipidemia and depression, who was admitted for management of HOCM and symptomatic bradycardia.  Pt underwent uneventful placement of AICD on 4/28, post op course uneventful.  Daughter at bedside, states no complaints s/p procedure.    -  Case discussed with Dr. Be, continue to up titrate verapamil and metoprolol as tolerated  - Please repeat TTE on Monday to reevaluate LVOT gradient   - Continue medical management as per primary team   - Will continue to follow SUBJECTIVE ASSESSMENT:  Patient seen this afternoon at bedside, doing well and not offering any complaints at this time. Denies any chest pain, shortness of breath, palpitations or abdominal pain.     Vital Signs Last 24 Hrs  T(C): 36.4, Max: 36.4 (04-30 @ 16:22)  T(F): 97.5, Max: 97.5 (04-30 @ 16:22)  HR: 75 (70 - 77)  BP: 102/57 (91/53 - 107/51)  BP(mean): --  RR: 16 (16 - 17)  SpO2: 96% (93% - 96%)  I&O's Detail  I & Os for 24h ending 30 Apr 2017 07:00  =============================================  IN:    Oral Fluid: 1138 ml    Total IN: 1138 ml  ---------------------------------------------  OUT:    Total OUT: 0 ml  ---------------------------------------------  Total NET: 1138 ml    I & Os for current day (as of 30 Apr 2017 17:57)  =============================================  IN:    Oral Fluid: 400 ml    Total IN: 400 ml  ---------------------------------------------  OUT:    Total OUT: 0 ml  ---------------------------------------------  Total NET: 400 ml    PHYSICAL EXAM:    General: Patient lying comfortably in bed, no acute distress     Neurological: Alert and oriented. No focal neurological deficits     Cardiovascular: S1S2, RRR, Grade II systolic murmur appreciated at LUSB     Respiratory: Clear to ausculation bilaterally     Gastrointestinal: Abdomen soft, non tender, non distended     Extremities: Warm and well perfused. No edema or calf tenderness     Vascular: Peripheral pulses 2+ bilaterally     Incision Sites: PPM incision C/D/I, mild swelling around site.     LABS:                        13.4   8.2   )-----------( 223      ( 30 Apr 2017 07:40 )             39.3     04-30    138  |  105  |  16  ----------------------------<  83  4.0   |  25  |  0.78    Ca    9.1      30 Apr 2017 07:40  Mg     2.3     04-30      MEDICATIONS  (STANDING):  chlorhexidine 4% Liquid 1Application(s) Topical once  sodium chloride 0.9%. 1000milliLiter(s) IV Continuous <Continuous>  aspirin enteric coated 81milliGRAM(s) Oral daily  citalopram 10milliGRAM(s) Oral daily  atorvastatin 20milliGRAM(s) Oral daily  OLANZapine 10milliGRAM(s) Oral daily  sodium chloride 0.9%. 500milliLiter(s) IV Continuous <Continuous>  heparin  Injectable 5000Unit(s) SubCutaneous every 8 hours  verapamil SR 180milliGRAM(s) Oral daily  vancomycin  IVPB 1000milliGRAM(s) IV Intermittent once  metoprolol 25milliGRAM(s) Oral two times a day  BACItracin   Ointment 1Application(s) Topical two times a day  senna 2Tablet(s) Oral daily  docusate sodium 100milliGRAM(s) Oral daily    MEDICATIONS  (PRN):  zolpidem 5milliGRAM(s) Oral at bedtime PRN Insomnia  zolpidem 5milliGRAM(s) Oral at bedtime PRN Insomnia  acetaminophen   Tablet. 650milliGRAM(s) Oral every 6 hours PRN Mild Pain (1 - 3)    A/P: 70 y/o Mosotho speaking Female, with PMHx of hyperlipidemia and depression, who was admitted for management of HOCM and symptomatic bradycardia.  Pt underwent uneventful placement of AICD on 4/28, post op course uneventful.  Daughter at bedside, states no complaints s/p procedure.    imp: hypertrophic cardiomyopathy with CURTIS/LVOT obstruction with severe gradients; likely mild-moderate aortic stenosis concomittant (predominately LVOTO); chronic diastolic CHF; syncope; depression. s/p DC ICD. CMR with maximal septal thickness < 20mm - not candidate for EtOH septal ablation.  - Case discussed with Dr. Be, continue to up titrate verapamil and metoprolol as tolerated  - Repeat TTE on Monday to reevaluate LVOT gradient   - Family screening

## 2017-04-30 NOTE — PROGRESS NOTE ADULT - PROBLEM SELECTOR PROBLEM 2
Symptomatic bradycardia
Depression
Symptomatic bradycardia

## 2017-04-30 NOTE — PROGRESS NOTE ADULT - ATTENDING COMMENTS
Covering for Dr. Albert.  Pt seen and examined this am with no complaints. Heart rate is maintained on current dose of Verapamil. D/w EP either increase in Verapamil vs. initiation of beta blockade. Cont to monitor with plan to uptitrate meds over weekend and repeat TTE on Monday prior to discharge.
Covering for Dr. Albert. Pt with ectopy noted on tele from o/n. Appreciate EP input and pt now on Verapamil and Metoprolol and tolerating both doses well. Plan to repeat TTE tomorrow and likely to DC home thereafter.

## 2017-04-30 NOTE — PROGRESS NOTE ADULT - PROBLEM SELECTOR PROBLEM 1
HOCM (hypertrophic obstructive cardiomyopathy)

## 2017-04-30 NOTE — PROGRESS NOTE ADULT - PROBLEM SELECTOR PLAN 1
Cath 4/25: Non obs CAD, normal right sided pressures, hyperdynamic LV.  Echo 4/25: EF 75%, severe asymmetrical hypertrophy, mild AR, CURTIS of anterior mitral leaflet causing LVOT obstruction. Peak LVOT gradient is 77mmHg. Mild pulm HTN.  Cardiac MRI 4/26: No myocardial scarring, maximum end diastolic wall thickness is 16 mm at basal anteroseptum.  Pt s/p AICD placed on 4/28/17.  Dr. Be following, recommends uptitrating BB and CCB as needed and repeat echo Monday to assess LVOT gradient.  Continue Verapamil 180mg daily and Lopressor 25mg BID. Cath 4/25: Non obs CAD, normal right sided pressures, hyperdynamic LV.  Echo 4/25: EF 75%, severe asymmetrical hypertrophy, mild AR, CURTIS of anterior mitral leaflet causing LVOT obstruction. Peak LVOT gradient is 77mmHg. Mild pulm HTN.  Cardiac MRI 4/26: No myocardial scarring, maximum end diastolic wall thickness is 16 mm at basal anteroseptum.  Pt s/p AICD placed on 4/28/17.  Dr. Be following, recommends uptitrating BB and CCB as tolerated (currently borderline hypotensive) and repeat echo Monday to assess LVOT gradient.  Continue Verapamil 180mg daily and Lopressor 25mg BID, no need to increase per EP for ectopy seen overnight as pt has AICD.

## 2017-04-30 NOTE — PROGRESS NOTE ADULT - SUBJECTIVE AND OBJECTIVE BOX
Interventional Cardiology PA Adult Progress Note    Subjective: No complaints this morning.  	    MEDICATIONS:  verapamil SR 180milliGRAM(s) Oral daily  metoprolol 25milliGRAM(s) Oral two times a day  vancomycin  IVPB 1000milliGRAM(s) IV Intermittent once  citalopram 10milliGRAM(s) Oral daily  OLANZapine 10milliGRAM(s) Oral daily  zolpidem 5milliGRAM(s) Oral at bedtime PRN  zolpidem 5milliGRAM(s) Oral at bedtime PRN  acetaminophen   Tablet. 650milliGRAM(s) Oral every 6 hours PRN  senna 2Tablet(s) Oral daily  docusate sodium 100milliGRAM(s) Oral daily  atorvastatin 20milliGRAM(s) Oral daily  chlorhexidine 4% Liquid 1Application(s) Topical once  sodium chloride 0.9%. 1000milliLiter(s) IV Continuous <Continuous>  aspirin enteric coated 81milliGRAM(s) Oral daily  sodium chloride 0.9%. 500milliLiter(s) IV Continuous <Continuous>  heparin  Injectable 5000Unit(s) SubCutaneous every 8 hours  BACItracin   Ointment 1Application(s) Topical two times a day  	    PHYSICAL EXAM:  TELEMETRY:  T(C): 35.9, Max: 37.2 (04-29 @ 17:45)  HR: 77 (70 - 84)  BP: 104/54 (91/53 - 111/57)  RR: 16 (15 - 17)  SpO2: 95% (93% - 96%)  Wt(kg): --  I&O's Summary  I & Os for 24h ending 30 Apr 2017 07:00  =============================================  IN: 1138 ml / OUT: 0 ml / NET: 1138 ml    I & Os for current day (as of 30 Apr 2017 15:10)  =============================================  IN: 400 ml / OUT: 0 ml / NET: 400 ml      Travis:                                       General: NAD	  HEENT:   Normal oral mucosa, PERRL, EOMI	  Neck: Supple, - JVD; Carotid Bruit   Cardiovascular: Normal S1 S2, No JVD, No murmurs. Left AICD.  Respiratory: Lungs clear to auscultation  Gastrointestinal:  Soft, Non-tender, + BS	  Extremities: Normal range of motion, No clubbing, cyanosis or edema  Vascular: Peripheral pulses palpable 2+ bilaterally  Neurologic: A + O x3, no focal deficits.      LABS:	 	                        13.4   8.2   )-----------( 223      ( 30 Apr 2017 07:40 )             39.3     04-30    138  |  105  |  16  ----------------------------<  83  4.0   |  25  |  0.78    Ca    9.1      30 Apr 2017 07:40  Mg     2.3     04-30 Interventional Cardiology PA Adult Progress Note    Subjective: No complaints this morning. Ectopy seen on telemetry overnight.  	    MEDICATIONS:  verapamil SR 180milliGRAM(s) Oral daily  metoprolol 25milliGRAM(s) Oral two times a day  vancomycin  IVPB 1000milliGRAM(s) IV Intermittent once  citalopram 10milliGRAM(s) Oral daily  OLANZapine 10milliGRAM(s) Oral daily  zolpidem 5milliGRAM(s) Oral at bedtime PRN  zolpidem 5milliGRAM(s) Oral at bedtime PRN  acetaminophen   Tablet. 650milliGRAM(s) Oral every 6 hours PRN  senna 2Tablet(s) Oral daily  docusate sodium 100milliGRAM(s) Oral daily  atorvastatin 20milliGRAM(s) Oral daily  chlorhexidine 4% Liquid 1Application(s) Topical once  sodium chloride 0.9%. 1000milliLiter(s) IV Continuous <Continuous>  aspirin enteric coated 81milliGRAM(s) Oral daily  sodium chloride 0.9%. 500milliLiter(s) IV Continuous <Continuous>  heparin  Injectable 5000Unit(s) SubCutaneous every 8 hours  BACItracin   Ointment 1Application(s) Topical two times a day  	    PHYSICAL EXAM:  TELEMETRY:  T(C): 35.9, Max: 37.2 (04-29 @ 17:45)  HR: 77 (70 - 84)  BP: 104/54 (91/53 - 111/57)  RR: 16 (15 - 17)  SpO2: 95% (93% - 96%)  Wt(kg): --  I&O's Summary  I & Os for 24h ending 30 Apr 2017 07:00  =============================================  IN: 1138 ml / OUT: 0 ml / NET: 1138 ml    I & Os for current day (as of 30 Apr 2017 15:10)  =============================================  IN: 400 ml / OUT: 0 ml / NET: 400 ml      Travis:                                       General: NAD	  HEENT:   Normal oral mucosa, PERRL, EOMI	  Neck: Supple, - JVD; Carotid Bruit   Cardiovascular: Normal S1 S2, No JVD, No murmurs. Left AICD.  Respiratory: Lungs clear to auscultation  Gastrointestinal:  Soft, Non-tender, + BS	  Extremities: Normal range of motion, No clubbing, cyanosis or edema  Vascular: Peripheral pulses palpable 2+ bilaterally  Neurologic: A + O x3, no focal deficits.      LABS:	 	                        13.4   8.2   )-----------( 223      ( 30 Apr 2017 07:40 )             39.3     04-30    138  |  105  |  16  ----------------------------<  83  4.0   |  25  |  0.78    Ca    9.1      30 Apr 2017 07:40  Mg     2.3     04-30

## 2017-05-01 ENCOUNTER — TRANSCRIPTION ENCOUNTER (OUTPATIENT)
Age: 72
End: 2017-05-01

## 2017-05-01 VITALS
HEART RATE: 60 BPM | RESPIRATION RATE: 16 BRPM | SYSTOLIC BLOOD PRESSURE: 104 MMHG | OXYGEN SATURATION: 94 % | DIASTOLIC BLOOD PRESSURE: 57 MMHG

## 2017-05-01 LAB
ANION GAP SERPL CALC-SCNC: 8 MMOL/L — LOW (ref 9–16)
BUN SERPL-MCNC: 18 MG/DL — SIGNIFICANT CHANGE UP (ref 7–23)
CALCIUM SERPL-MCNC: 9.1 MG/DL — SIGNIFICANT CHANGE UP (ref 8.5–10.5)
CHLORIDE SERPL-SCNC: 106 MMOL/L — SIGNIFICANT CHANGE UP (ref 96–108)
CO2 SERPL-SCNC: 26 MMOL/L — SIGNIFICANT CHANGE UP (ref 22–31)
CREAT SERPL-MCNC: 0.75 MG/DL — SIGNIFICANT CHANGE UP (ref 0.5–1.3)
GLUCOSE SERPL-MCNC: 82 MG/DL — SIGNIFICANT CHANGE UP (ref 70–99)
HCT VFR BLD CALC: 39 % — SIGNIFICANT CHANGE UP (ref 34.5–45)
HGB BLD-MCNC: 13.4 G/DL — SIGNIFICANT CHANGE UP (ref 11.5–15.5)
MAGNESIUM SERPL-MCNC: 2.2 MG/DL — SIGNIFICANT CHANGE UP (ref 1.6–2.4)
MCHC RBC-ENTMCNC: 29 PG — SIGNIFICANT CHANGE UP (ref 27–34)
MCHC RBC-ENTMCNC: 34.4 G/DL — SIGNIFICANT CHANGE UP (ref 32–36)
MCV RBC AUTO: 84.4 FL — SIGNIFICANT CHANGE UP (ref 80–100)
PLATELET # BLD AUTO: 208 K/UL — SIGNIFICANT CHANGE UP (ref 150–400)
POTASSIUM SERPL-MCNC: 3.9 MMOL/L — SIGNIFICANT CHANGE UP (ref 3.5–5.3)
POTASSIUM SERPL-SCNC: 3.9 MMOL/L — SIGNIFICANT CHANGE UP (ref 3.5–5.3)
RBC # BLD: 4.62 M/UL — SIGNIFICANT CHANGE UP (ref 3.8–5.2)
RBC # FLD: 13.9 % — SIGNIFICANT CHANGE UP (ref 10.3–16.9)
SODIUM SERPL-SCNC: 140 MMOL/L — SIGNIFICANT CHANGE UP (ref 135–145)
WBC # BLD: 8 K/UL — SIGNIFICANT CHANGE UP (ref 3.8–10.5)
WBC # FLD AUTO: 8 K/UL — SIGNIFICANT CHANGE UP (ref 3.8–10.5)

## 2017-05-01 PROCEDURE — 99239 HOSP IP/OBS DSCHRG MGMT >30: CPT

## 2017-05-01 PROCEDURE — 93306 TTE W/DOPPLER COMPLETE: CPT | Mod: 26

## 2017-05-01 RX ORDER — METOPROLOL TARTRATE 50 MG
1 TABLET ORAL
Qty: 60 | Refills: 2 | OUTPATIENT
Start: 2017-05-01 | End: 2017-07-29

## 2017-05-01 RX ORDER — VERAPAMIL HCL 240 MG
1 CAPSULE, EXTENDED RELEASE PELLETS 24 HR ORAL
Qty: 0 | Refills: 0 | COMMUNITY
Start: 2017-05-01

## 2017-05-01 RX ORDER — VERAPAMIL HCL 240 MG
1 CAPSULE, EXTENDED RELEASE PELLETS 24 HR ORAL
Qty: 30 | Refills: 2 | OUTPATIENT
Start: 2017-05-01 | End: 2017-07-29

## 2017-05-01 RX ORDER — ASPIRIN/CALCIUM CARB/MAGNESIUM 324 MG
1 TABLET ORAL
Qty: 0 | Refills: 0 | COMMUNITY
Start: 2017-05-01

## 2017-05-01 RX ORDER — METOPROLOL TARTRATE 50 MG
1 TABLET ORAL
Qty: 0 | Refills: 0 | COMMUNITY
Start: 2017-05-01

## 2017-05-01 RX ADMIN — Medication 180 MILLIGRAM(S): at 05:29

## 2017-05-01 RX ADMIN — Medication 1 APPLICATION(S): at 05:29

## 2017-05-01 RX ADMIN — HEPARIN SODIUM 5000 UNIT(S): 5000 INJECTION INTRAVENOUS; SUBCUTANEOUS at 05:29

## 2017-05-01 RX ADMIN — Medication 25 MILLIGRAM(S): at 05:29

## 2017-05-01 RX ADMIN — Medication 81 MILLIGRAM(S): at 11:48

## 2017-05-01 RX ADMIN — CITALOPRAM 10 MILLIGRAM(S): 10 TABLET, FILM COATED ORAL at 11:47

## 2017-05-01 RX ADMIN — OLANZAPINE 10 MILLIGRAM(S): 15 TABLET, FILM COATED ORAL at 11:48

## 2017-05-01 NOTE — DISCHARGE NOTE ADULT - MEDICATION SUMMARY - MEDICATIONS TO STOP TAKING
I will STOP taking the medications listed below when I get home from the hospital:    Plavix 75 mg oral tablet  -- 1 tab(s) by mouth once a day

## 2017-05-01 NOTE — CHART NOTE - NSCHARTNOTEFT_GEN_A_CORE
Spoke to Dr. Be from [facility], echo from today shows LVOT gradient of 53mmHg.  Discussed with Dr. Be, we will sign off on this case for now.  Management per primary team, thank you for the consult.

## 2017-05-01 NOTE — DISCHARGE NOTE ADULT - MEDICATION SUMMARY - MEDICATIONS TO TAKE
I will START or STAY ON the medications listed below when I get home from the hospital:    aspirin 81 mg oral delayed release tablet  -- 1 tab(s) by mouth once a day  -- Indication: For Coronary artery Disease    verapamil 180 mg/12 hours oral tablet, extended release  -- 1 tab(s) by mouth once a day  -- Indication: For Hypertrophic obstructive cardiomyopathy    citalopram 10 mg oral tablet  -- 1 tab(s) by mouth once a day  -- Indication: For Depression    Lipitor 20 mg oral tablet  -- 1 tab(s) by mouth once a day  -- Indication: For Cholesterol    ZyPREXA 10 mg oral tablet  -- 1 tab(s) by mouth once a day  -- Indication: For Depression    zolpidem 10 mg oral tablet  -- 1 tab(s) by mouth once a day (at bedtime)  -- Indication: For Sleep    metoprolol tartrate 25 mg oral tablet  -- 1 tab(s) by mouth 2 times a day  -- Indication: For Hypertrophic obstructive cardiomyopathy

## 2017-05-01 NOTE — DISCHARGE NOTE ADULT - CARE PROVIDER_API CALL
Troy Stephenson (), Cardiovascular Disease; Internal Medicine; Interventional Cardiology  [address] [address]  Phone: [phone]  Fax: [phone]    Dexter Albert), Cardiology; Internal Medicine; Nuclear Cardiology  [address] [place] [address]  Phone: [phone]  Fax: [phone]    Nalini Evans), Cardiac Electrophysiology; Cardiovascular Disease; Internal Medicine  [address] [place] [address]  Phone: [phone]  Fax: [phone]    Andre eB), Cardiology; Internal Medicine; Interventional Cardiology  [address] [address]  Phone: [phone]  Fax: [phone]

## 2017-05-01 NOTE — DISCHARGE NOTE ADULT - PLAN OF CARE
You have had an automated implantable cardioverter-defibrillator (AICD) placed. Continue taking Metoprolol and Verapamil as instructed.    Follow up with Dr. Stephenson at [facility] on Friday May 5th, 2017. Call [phone] for appointment.    You can also see Dr. Dexter Albert at [facility] in 1-2 weeks.    For your hypertrophic cardiomyopathy, follow up with Dr. Andre Be at [facility] in 1-2 weeks. Call [phone] for appointment.    Finally, follow up with electrophysiologist Dr. Radha Evans in Continue monitoring your AICD placement site for signs of infection (warmth, pus/discharge, fevers, bleeding, redness) Do not lift your left arm above your shoulder for extended periods of time until your follow up. Do not soak the site underwater for prolonged periods of time (swimming or soaking in a bathtub). You make take a shower and let water run down the site, do not scrub or use harsh soaps/detergents on the area until you follow up with Dr. Evans. No heavy lifting for at least 1 week. Continue taking Metoprolol and Verapamil as instructed.    Follow up with Dr. Stephenson at [facility] on Friday May 5th, 2017. Call [phone] for appointment.    You can also see Dr. Dexter Albert at [facility] in 1-2 weeks.    For your hypertrophic cardiomyopathy, follow up with Dr. Andre Be at [facility] in 1-2 weeks. Call [phone] for appointment.    Finally, follow up with electrophysiologist Dr. Radha Evans on June 12th, 2017 at [facility] at 2:20 PM.

## 2017-05-01 NOTE — DISCHARGE NOTE ADULT - CARE PROVIDERS DIRECT ADDRESSES
[email or web address]

## 2017-05-01 NOTE — DISCHARGE NOTE ADULT - PATIENT PORTAL LINK FT
“You can access the Follow[employer] Patient Portal, offered by [facility], by registering with the following website: [email or web address]”

## 2017-05-01 NOTE — DISCHARGE NOTE ADULT - CARE PLAN
Principal Discharge DX:	HOCM (hypertrophic obstructive cardiomyopathy)  Goal:	You have had an automated implantable cardioverter-defibrillator (AICD) placed.  Instructions for follow-up, activity and diet:	Continue taking Metoprolol and Verapamil as instructed.    Follow up with Dr. Stephenson at [facility] on Friday May 5th, 2017. Call [phone] for appointment.    You can also see Dr. Dexter Albert at [facility] in 1-2 weeks.    For your hypertrophic cardiomyopathy, follow up with Dr. Andre Be at [facility] in 1-2 weeks. Call [phone] for appointment.    Finally, follow up with electrophysiologist Dr. Radha Evans in Principal Discharge DX:	HOCM (hypertrophic obstructive cardiomyopathy)  Goal:	You have had an automated implantable cardioverter-defibrillator (AICD) placed.  Instructions for follow-up, activity and diet:	Continue taking Metoprolol and Verapamil as instructed.    Follow up with Dr. Stephenson at [facility] on Friday May 5th, 2017. Call [phone] for appointment.    You can also see Dr. Dexter Albert at [facility] in 1-2 weeks.    For your hypertrophic cardiomyopathy, follow up with Dr. Andre Be at [facility] in 1-2 weeks. Call [phone] for appointment.    Finally, follow up with electrophysiologist Dr. Radha Evans on June 12th, 2017 at [facility] at 2:20 PM.  Secondary Diagnosis:	AICD (automatic cardioverter/defibrillator) present  Goal:	Continue monitoring your AICD placement site for signs of infection (warmth, pus/discharge, fevers, bleeding, redness)  Instructions for follow-up, activity and diet:	Do not lift your left arm above your shoulder for extended periods of time until your follow up. Do not soak the site underwater for prolonged periods of time (swimming or soaking in a bathtub). You make take a shower and let water run down the site, do not scrub or use harsh soaps/detergents on the area until you follow up with Dr. Evans. No heavy lifting for at least 1 week. Principal Discharge DX:	HOCM (hypertrophic obstructive cardiomyopathy)  Goal:	You have had an automated implantable cardioverter-defibrillator (AICD) placed.  Instructions for follow-up, activity and diet:	Continue taking Metoprolol and Verapamil as instructed.    Follow up with Dr. Stephenson at [facility] on Friday May 5th, 2017. Call [phone] for appointment.    You can also see Dr. Dexter Albert at [facility] in 1-2 weeks.    For your hypertrophic cardiomyopathy, follow up with Dr. Andre Be at [facility] in 1-2 weeks. Call [phone] for appointment.    Finally, follow up with electrophysiologist Dr. Radha Evans on June 12th, 2017 at [facility] at 2:20 PM.  Secondary Diagnosis:	AICD (automatic cardioverter/defibrillator) present  Goal:	Continue monitoring your AICD placement site for signs of infection (warmth, pus/discharge, fevers, bleeding, redness)  Instructions for follow-up, activity and diet:	Do not lift your left arm above your shoulder for extended periods of time until your follow up. Do not soak the site underwater for prolonged periods of time (swimming or soaking in a bathtub). You make take a shower and let water run down the site, do not scrub or use harsh soaps/detergents on the area until you follow up with Dr. Evans. No heavy lifting for at least 1 week.

## 2017-05-03 DIAGNOSIS — I42.1 OBSTRUCTIVE HYPERTROPHIC CARDIOMYOPATHY: ICD-10-CM

## 2017-05-03 DIAGNOSIS — I07.1 RHEUMATIC TRICUSPID INSUFFICIENCY: ICD-10-CM

## 2017-05-03 DIAGNOSIS — I49.3 VENTRICULAR PREMATURE DEPOLARIZATION: ICD-10-CM

## 2017-05-03 DIAGNOSIS — I25.10 ATHEROSCLEROTIC HEART DISEASE OF NATIVE CORONARY ARTERY WITHOUT ANGINA PECTORIS: ICD-10-CM

## 2017-05-03 DIAGNOSIS — I50.32 CHRONIC DIASTOLIC (CONGESTIVE) HEART FAILURE: ICD-10-CM

## 2017-05-03 DIAGNOSIS — I35.0 NONRHEUMATIC AORTIC (VALVE) STENOSIS: ICD-10-CM

## 2017-05-03 DIAGNOSIS — E78.5 HYPERLIPIDEMIA, UNSPECIFIED: ICD-10-CM

## 2017-05-03 DIAGNOSIS — R73.03 PREDIABETES: ICD-10-CM

## 2017-05-03 DIAGNOSIS — Z53.29 PROCEDURE AND TREATMENT NOT CARRIED OUT BECAUSE OF PATIENT'S DECISION FOR OTHER REASONS: ICD-10-CM

## 2017-05-03 DIAGNOSIS — F32.9 MAJOR DEPRESSIVE DISORDER, SINGLE EPISODE, UNSPECIFIED: ICD-10-CM

## 2017-05-03 DIAGNOSIS — I35.1 NONRHEUMATIC AORTIC (VALVE) INSUFFICIENCY: ICD-10-CM

## 2017-05-03 DIAGNOSIS — K64.9 UNSPECIFIED HEMORRHOIDS: ICD-10-CM

## 2017-05-03 DIAGNOSIS — R00.1 BRADYCARDIA, UNSPECIFIED: ICD-10-CM

## 2017-05-03 DIAGNOSIS — Z79.82 LONG TERM (CURRENT) USE OF ASPIRIN: ICD-10-CM

## 2017-05-03 DIAGNOSIS — I27.2 OTHER SECONDARY PULMONARY HYPERTENSION: ICD-10-CM

## 2017-05-03 DIAGNOSIS — Z79.02 LONG TERM (CURRENT) USE OF ANTITHROMBOTICS/ANTIPLATELETS: ICD-10-CM

## 2017-05-12 ENCOUNTER — APPOINTMENT (OUTPATIENT)
Dept: HEART AND VASCULAR | Facility: CLINIC | Age: 72
End: 2017-05-12

## 2017-05-12 VITALS — DIASTOLIC BLOOD PRESSURE: 55 MMHG | SYSTOLIC BLOOD PRESSURE: 100 MMHG | OXYGEN SATURATION: 97 % | HEART RATE: 61 BPM

## 2017-05-15 ENCOUNTER — APPOINTMENT (OUTPATIENT)
Dept: CARDIOTHORACIC SURGERY | Facility: CLINIC | Age: 72
End: 2017-05-15

## 2017-05-15 VITALS
TEMPERATURE: 97 F | HEART RATE: 74 BPM | HEIGHT: 62 IN | OXYGEN SATURATION: 96 % | DIASTOLIC BLOOD PRESSURE: 72 MMHG | SYSTOLIC BLOOD PRESSURE: 119 MMHG | WEIGHT: 171 LBS | RESPIRATION RATE: 18 BRPM | BODY MASS INDEX: 31.47 KG/M2

## 2017-05-17 RX ORDER — ATORVASTATIN CALCIUM 80 MG/1
1 TABLET, FILM COATED ORAL
Qty: 0 | Refills: 0 | COMMUNITY

## 2017-05-17 RX ORDER — VERAPAMIL HCL 240 MG
0 CAPSULE, EXTENDED RELEASE PELLETS 24 HR ORAL
Qty: 0 | Refills: 0 | COMMUNITY

## 2017-05-17 RX ORDER — VERAPAMIL HCL 240 MG
20 CAPSULE, EXTENDED RELEASE PELLETS 24 HR ORAL
Qty: 0 | Refills: 0 | COMMUNITY

## 2017-05-17 RX ORDER — ASPIRIN/CALCIUM CARB/MAGNESIUM 324 MG
1 TABLET ORAL
Qty: 0 | Refills: 0 | COMMUNITY

## 2017-05-17 RX ORDER — ZOLPIDEM TARTRATE 10 MG/1
1 TABLET ORAL
Qty: 0 | Refills: 0 | COMMUNITY

## 2017-05-17 RX ORDER — CITALOPRAM 10 MG/1
1 TABLET, FILM COATED ORAL
Qty: 0 | Refills: 0 | COMMUNITY

## 2017-05-17 RX ORDER — CLOPIDOGREL BISULFATE 75 MG/1
1 TABLET, FILM COATED ORAL
Qty: 0 | Refills: 0 | COMMUNITY

## 2017-05-17 RX ORDER — OLANZAPINE 15 MG/1
1 TABLET, FILM COATED ORAL
Qty: 0 | Refills: 0 | COMMUNITY

## 2017-06-12 ENCOUNTER — APPOINTMENT (OUTPATIENT)
Dept: HEART AND VASCULAR | Facility: CLINIC | Age: 72
End: 2017-06-12

## 2017-06-12 VITALS — SYSTOLIC BLOOD PRESSURE: 105 MMHG | DIASTOLIC BLOOD PRESSURE: 52 MMHG | HEART RATE: 71 BPM

## 2017-07-23 PROCEDURE — 71046 X-RAY EXAM CHEST 2 VIEWS: CPT

## 2017-07-23 PROCEDURE — 93321 DOPPLER ECHO F-UP/LMTD STD: CPT

## 2017-07-23 PROCEDURE — A9577: CPT

## 2017-07-23 PROCEDURE — C1889: CPT

## 2017-07-23 PROCEDURE — 36415 COLL VENOUS BLD VENIPUNCTURE: CPT

## 2017-07-23 PROCEDURE — 83735 ASSAY OF MAGNESIUM: CPT

## 2017-07-23 PROCEDURE — 93308 TTE F-UP OR LMTD: CPT

## 2017-07-23 PROCEDURE — 80053 COMPREHEN METABOLIC PANEL: CPT

## 2017-07-23 PROCEDURE — C1769: CPT

## 2017-07-23 PROCEDURE — 84443 ASSAY THYROID STIM HORMONE: CPT

## 2017-07-23 PROCEDURE — C1894: CPT

## 2017-07-23 PROCEDURE — 85025 COMPLETE CBC W/AUTO DIFF WBC: CPT

## 2017-07-23 PROCEDURE — 80048 BASIC METABOLIC PNL TOTAL CA: CPT

## 2017-07-23 PROCEDURE — 83036 HEMOGLOBIN GLYCOSYLATED A1C: CPT

## 2017-07-23 PROCEDURE — 85610 PROTHROMBIN TIME: CPT

## 2017-07-23 PROCEDURE — C1898: CPT

## 2017-07-23 PROCEDURE — 85027 COMPLETE CBC AUTOMATED: CPT

## 2017-07-23 PROCEDURE — 75561 CARDIAC MRI FOR MORPH W/DYE: CPT

## 2017-07-23 PROCEDURE — 85730 THROMBOPLASTIN TIME PARTIAL: CPT

## 2017-07-23 PROCEDURE — 93005 ELECTROCARDIOGRAM TRACING: CPT

## 2017-07-23 PROCEDURE — 71045 X-RAY EXAM CHEST 1 VIEW: CPT

## 2017-07-23 PROCEDURE — 93306 TTE W/DOPPLER COMPLETE: CPT

## 2017-07-23 PROCEDURE — C1887: CPT

## 2017-07-23 PROCEDURE — C1777: CPT

## 2017-07-23 PROCEDURE — C1721: CPT

## 2018-04-14 ENCOUNTER — EMERGENCY (EMERGENCY)
Facility: HOSPITAL | Age: 73
LOS: 1 days | Discharge: HOME | End: 2018-04-14
Attending: EMERGENCY MEDICINE | Admitting: EMERGENCY MEDICINE
Payer: MEDICAID

## 2018-04-14 VITALS
OXYGEN SATURATION: 96 % | RESPIRATION RATE: 18 BRPM | WEIGHT: 175.05 LBS | HEART RATE: 83 BPM | TEMPERATURE: 98 F | HEIGHT: 63 IN | DIASTOLIC BLOOD PRESSURE: 57 MMHG | SYSTOLIC BLOOD PRESSURE: 95 MMHG

## 2018-04-14 VITALS — RESPIRATION RATE: 18 BRPM | OXYGEN SATURATION: 96 % | HEART RATE: 65 BPM | TEMPERATURE: 98 F

## 2018-04-14 DIAGNOSIS — E78.5 HYPERLIPIDEMIA, UNSPECIFIED: ICD-10-CM

## 2018-04-14 DIAGNOSIS — R42 DIZZINESS AND GIDDINESS: ICD-10-CM

## 2018-04-14 DIAGNOSIS — Z95.1 PRESENCE OF AORTOCORONARY BYPASS GRAFT: ICD-10-CM

## 2018-04-14 DIAGNOSIS — Z95.810 PRESENCE OF AUTOMATIC (IMPLANTABLE) CARDIAC DEFIBRILLATOR: ICD-10-CM

## 2018-04-14 DIAGNOSIS — I10 ESSENTIAL (PRIMARY) HYPERTENSION: ICD-10-CM

## 2018-04-14 DIAGNOSIS — Z79.899 OTHER LONG TERM (CURRENT) DRUG THERAPY: ICD-10-CM

## 2018-04-14 DIAGNOSIS — Z79.2 LONG TERM (CURRENT) USE OF ANTIBIOTICS: ICD-10-CM

## 2018-04-14 DIAGNOSIS — Z79.82 LONG TERM (CURRENT) USE OF ASPIRIN: ICD-10-CM

## 2018-04-14 PROBLEM — K64.9 UNSPECIFIED HEMORRHOIDS: Chronic | Status: ACTIVE | Noted: 2017-04-24

## 2018-04-14 PROBLEM — F32.9 MAJOR DEPRESSIVE DISORDER, SINGLE EPISODE, UNSPECIFIED: Chronic | Status: ACTIVE | Noted: 2017-04-24

## 2018-04-14 LAB
ALBUMIN SERPL ELPH-MCNC: 3.8 G/DL — SIGNIFICANT CHANGE UP (ref 3.3–5)
ALP SERPL-CCNC: 88 U/L — SIGNIFICANT CHANGE UP (ref 40–120)
ALT FLD-CCNC: 12 U/L — SIGNIFICANT CHANGE UP (ref 10–45)
ANION GAP SERPL CALC-SCNC: 12 MMOL/L — SIGNIFICANT CHANGE UP (ref 5–17)
APTT BLD: 33.6 SEC — SIGNIFICANT CHANGE UP (ref 27.5–37.4)
AST SERPL-CCNC: 16 U/L — SIGNIFICANT CHANGE UP (ref 10–40)
BASOPHILS NFR BLD AUTO: 0.5 % — SIGNIFICANT CHANGE UP (ref 0–2)
BILIRUB SERPL-MCNC: 0.4 MG/DL — SIGNIFICANT CHANGE UP (ref 0.2–1.2)
BUN SERPL-MCNC: 14 MG/DL — SIGNIFICANT CHANGE UP (ref 7–23)
CALCIUM SERPL-MCNC: 9.7 MG/DL — SIGNIFICANT CHANGE UP (ref 8.4–10.5)
CHLORIDE SERPL-SCNC: 102 MMOL/L — SIGNIFICANT CHANGE UP (ref 96–108)
CK MB CFR SERPL CALC: 1.8 NG/ML — SIGNIFICANT CHANGE UP (ref 0–6.7)
CK SERPL-CCNC: 109 U/L — SIGNIFICANT CHANGE UP (ref 25–170)
CO2 SERPL-SCNC: 26 MMOL/L — SIGNIFICANT CHANGE UP (ref 22–31)
CREAT SERPL-MCNC: 1.06 MG/DL — SIGNIFICANT CHANGE UP (ref 0.5–1.3)
EOSINOPHIL NFR BLD AUTO: 2.9 % — SIGNIFICANT CHANGE UP (ref 0–6)
GLUCOSE SERPL-MCNC: 118 MG/DL — HIGH (ref 70–99)
HCT VFR BLD CALC: 40.6 % — SIGNIFICANT CHANGE UP (ref 34.5–45)
HGB BLD-MCNC: 14.1 G/DL — SIGNIFICANT CHANGE UP (ref 11.5–15.5)
INR BLD: 1.05 — SIGNIFICANT CHANGE UP (ref 0.88–1.16)
LYMPHOCYTES # BLD AUTO: 36.5 % — SIGNIFICANT CHANGE UP (ref 13–44)
MCHC RBC-ENTMCNC: 28.5 PG — SIGNIFICANT CHANGE UP (ref 27–34)
MCHC RBC-ENTMCNC: 34.7 G/DL — SIGNIFICANT CHANGE UP (ref 32–36)
MCV RBC AUTO: 82 FL — SIGNIFICANT CHANGE UP (ref 80–100)
MONOCYTES NFR BLD AUTO: 7.3 % — SIGNIFICANT CHANGE UP (ref 2–14)
NEUTROPHILS NFR BLD AUTO: 52.8 % — SIGNIFICANT CHANGE UP (ref 43–77)
PLATELET # BLD AUTO: 271 K/UL — SIGNIFICANT CHANGE UP (ref 150–400)
POTASSIUM SERPL-MCNC: 4.5 MMOL/L — SIGNIFICANT CHANGE UP (ref 3.5–5.3)
POTASSIUM SERPL-SCNC: 4.5 MMOL/L — SIGNIFICANT CHANGE UP (ref 3.5–5.3)
PROT SERPL-MCNC: 7.4 G/DL — SIGNIFICANT CHANGE UP (ref 6–8.3)
PROTHROM AB SERPL-ACNC: 11.7 SEC — SIGNIFICANT CHANGE UP (ref 9.8–12.7)
RBC # BLD: 4.95 M/UL — SIGNIFICANT CHANGE UP (ref 3.8–5.2)
RBC # FLD: 13.6 % — SIGNIFICANT CHANGE UP (ref 10.3–16.9)
SODIUM SERPL-SCNC: 140 MMOL/L — SIGNIFICANT CHANGE UP (ref 135–145)
TROPONIN T SERPL-MCNC: <0.01 NG/ML — SIGNIFICANT CHANGE UP (ref 0–0.01)
WBC # BLD: 6.5 K/UL — SIGNIFICANT CHANGE UP (ref 3.8–10.5)
WBC # FLD AUTO: 6.5 K/UL — SIGNIFICANT CHANGE UP (ref 3.8–10.5)

## 2018-04-14 PROCEDURE — 99284 EMERGENCY DEPT VISIT MOD MDM: CPT | Mod: 25

## 2018-04-14 PROCEDURE — 93010 ELECTROCARDIOGRAM REPORT: CPT

## 2018-04-14 PROCEDURE — 70450 CT HEAD/BRAIN W/O DYE: CPT | Mod: 26

## 2018-04-14 PROCEDURE — 82553 CREATINE MB FRACTION: CPT

## 2018-04-14 PROCEDURE — 85025 COMPLETE CBC W/AUTO DIFF WBC: CPT

## 2018-04-14 PROCEDURE — 36415 COLL VENOUS BLD VENIPUNCTURE: CPT

## 2018-04-14 PROCEDURE — 82550 ASSAY OF CK (CPK): CPT

## 2018-04-14 PROCEDURE — 85610 PROTHROMBIN TIME: CPT

## 2018-04-14 PROCEDURE — 70450 CT HEAD/BRAIN W/O DYE: CPT

## 2018-04-14 PROCEDURE — 93005 ELECTROCARDIOGRAM TRACING: CPT

## 2018-04-14 PROCEDURE — 84484 ASSAY OF TROPONIN QUANT: CPT

## 2018-04-14 PROCEDURE — 85730 THROMBOPLASTIN TIME PARTIAL: CPT

## 2018-04-14 PROCEDURE — 80053 COMPREHEN METABOLIC PANEL: CPT

## 2018-04-14 RX ORDER — MECLIZINE HCL 12.5 MG
1 TABLET ORAL
Qty: 42 | Refills: 0 | OUTPATIENT
Start: 2018-04-14 | End: 2018-04-27

## 2018-04-14 RX ORDER — MECLIZINE HCL 12.5 MG
25 TABLET ORAL ONCE
Qty: 0 | Refills: 0 | Status: COMPLETED | OUTPATIENT
Start: 2018-04-14 | End: 2018-04-14

## 2018-04-14 RX ORDER — OLANZAPINE 15 MG/1
1 TABLET, FILM COATED ORAL
Qty: 0 | Refills: 0 | COMMUNITY

## 2018-04-14 RX ADMIN — Medication 25 MILLIGRAM(S): at 12:17

## 2018-04-14 NOTE — ED PROVIDER NOTE - MEDICAL DECISION MAKING DETAILS
Pt c/o dizziness w spinning component, no cardiac complaint.  ? vertigo, doubt cva, ? related to recent med changes, low suspicion for cardiac.  Will check orthostatics, try meclizine, ct head, labs, reassess.

## 2018-04-14 NOTE — ED PROVIDER NOTE - OBJECTIVE STATEMENT
73 yo female h/o htn, hld, depression, cardiomyopathy s/p aicd placement ~ 1 yr ago c/o dizziness - feels as if room spinning, worse w head and body motion.  No associated n/v, ha, change in vision/speech/gait, numbness or weakness in ext, cp, palpitations, sob, fever, uri sx.  Pt recently decreased olanzapine dose and noted sx since reducing meds.  No h/o vertigo.  No tinnitus, hearing loss.

## 2018-04-14 NOTE — ED PROVIDER NOTE - PMH
Depression    Depression    Hemorrhoids    HLD (hyperlipidemia)    HTN (hypertension)    Hyperlipidemia Cardiomyopathy    Depression    Depression    Hemorrhoids    HLD (hyperlipidemia)    HTN (hypertension)    Hyperlipidemia

## 2018-04-14 NOTE — ED ADULT NURSE NOTE - OBJECTIVE STATEMENT
Patient presents to the ED with family, Hebrew speaking, daughter translating at bedside complaining of dizziness for the past three weeks which has increased since yesterday. Denies any chest pain or shortness of breath. No fever, chills, nausea, vomiting or diarrhea. Has an AICD. Placed on the cardiac monitor. Labs sent. As per daughter, the dose of olanzapine was recently decreased.

## 2018-04-14 NOTE — ED PROVIDER NOTE - PROGRESS NOTE DETAILS
Pt resting quietly on stretcher with continuing complaint of pain.  Pt AAOx4 with no resp distress noted.  Pt denies any needs at this time.  Skin PWD.  Pt family at bedside. Will continue to monitor and follow plan of care.     Malorie Friedman RN  01/18/17 1535     Pt feeling better.  Not orthostatic, ct head neg for cva, labs wnl.  Ambulatory in ed w/o difficulty.  Will dc w meclizine.  No reply from Dr Albert. Pt discussed w covering cardiologist who agreed w dc plan and outpt fu.

## 2018-04-14 NOTE — ED PROVIDER NOTE - PSH
No significant past surgical history    S/P implantation of automatic cardioverter/defibrillator (AICD)

## 2018-04-14 NOTE — ED ADULT TRIAGE NOTE - CHIEF COMPLAINT QUOTE
Patient c/o dizziness and feeling tired for 3 weeks . History of hypertrophic obstructive cardiomyopathy . With AICD in placed . Denies any chest pain nor sob .

## 2018-04-15 DIAGNOSIS — Z95.810 PRESENCE OF AUTOMATIC (IMPLANTABLE) CARDIAC DEFIBRILLATOR: Chronic | ICD-10-CM

## 2018-04-27 ENCOUNTER — APPOINTMENT (OUTPATIENT)
Dept: HEART AND VASCULAR | Facility: CLINIC | Age: 73
End: 2018-04-27
Payer: MEDICAID

## 2018-04-27 VITALS — SYSTOLIC BLOOD PRESSURE: 122 MMHG | OXYGEN SATURATION: 96 % | HEART RATE: 83 BPM | DIASTOLIC BLOOD PRESSURE: 60 MMHG

## 2018-04-27 DIAGNOSIS — I42.1 OBSTRUCTIVE HYPERTROPHIC CARDIOMYOPATHY: ICD-10-CM

## 2018-04-27 DIAGNOSIS — F32.9 MAJOR DEPRESSIVE DISORDER, SINGLE EPISODE, UNSPECIFIED: ICD-10-CM

## 2018-04-27 DIAGNOSIS — I25.10 ATHEROSCLEROTIC HEART DISEASE OF NATIVE CORONARY ARTERY W/OUT ANGINA PECTORIS: ICD-10-CM

## 2018-04-27 PROCEDURE — 99214 OFFICE O/P EST MOD 30 MIN: CPT

## 2018-04-27 RX ORDER — METOPROLOL TARTRATE 25 MG/1
25 TABLET, FILM COATED ORAL TWICE DAILY
Qty: 60 | Refills: 2 | Status: DISCONTINUED | COMMUNITY
End: 2018-04-27

## 2018-04-29 ENCOUNTER — FORM ENCOUNTER (OUTPATIENT)
Age: 73
End: 2018-04-29

## 2018-04-30 ENCOUNTER — OUTPATIENT (OUTPATIENT)
Dept: OUTPATIENT SERVICES | Facility: HOSPITAL | Age: 73
LOS: 1 days | End: 2018-04-30
Payer: MEDICAID

## 2018-04-30 ENCOUNTER — APPOINTMENT (OUTPATIENT)
Dept: HEART AND VASCULAR | Facility: CLINIC | Age: 73
End: 2018-04-30
Payer: MEDICAID

## 2018-04-30 VITALS
SYSTOLIC BLOOD PRESSURE: 131 MMHG | WEIGHT: 172 LBS | HEIGHT: 62 IN | HEART RATE: 68 BPM | BODY MASS INDEX: 31.65 KG/M2 | DIASTOLIC BLOOD PRESSURE: 65 MMHG

## 2018-04-30 DIAGNOSIS — I42.1 OBSTRUCTIVE HYPERTROPHIC CARDIOMYOPATHY: ICD-10-CM

## 2018-04-30 DIAGNOSIS — Z95.810 PRESENCE OF AUTOMATIC (IMPLANTABLE) CARDIAC DEFIBRILLATOR: Chronic | ICD-10-CM

## 2018-04-30 PROCEDURE — 93306 TTE W/DOPPLER COMPLETE: CPT

## 2018-04-30 PROCEDURE — 93306 TTE W/DOPPLER COMPLETE: CPT | Mod: 26

## 2018-04-30 PROCEDURE — 93283 PRGRMG EVAL IMPLANTABLE DFB: CPT

## 2018-10-29 ENCOUNTER — APPOINTMENT (OUTPATIENT)
Dept: HEART AND VASCULAR | Facility: CLINIC | Age: 73
End: 2018-10-29
Payer: MEDICAID

## 2018-10-29 VITALS — DIASTOLIC BLOOD PRESSURE: 57 MMHG | SYSTOLIC BLOOD PRESSURE: 103 MMHG | HEART RATE: 84 BPM | HEIGHT: 62 IN

## 2018-10-29 PROBLEM — I42.9 CARDIOMYOPATHY, UNSPECIFIED: Chronic | Status: ACTIVE | Noted: 2018-04-15

## 2018-10-29 PROCEDURE — 93280 PM DEVICE PROGR EVAL DUAL: CPT

## 2018-10-29 RX ORDER — LORAZEPAM 2 MG/1
TABLET ORAL
Refills: 0 | Status: ACTIVE | COMMUNITY

## 2019-04-15 ENCOUNTER — APPOINTMENT (OUTPATIENT)
Dept: HEART AND VASCULAR | Facility: CLINIC | Age: 74
End: 2019-04-15
Payer: MEDICARE

## 2019-04-15 VITALS
WEIGHT: 180 LBS | HEART RATE: 80 BPM | HEIGHT: 62 IN | BODY MASS INDEX: 33.13 KG/M2 | SYSTOLIC BLOOD PRESSURE: 117 MMHG | DIASTOLIC BLOOD PRESSURE: 64 MMHG

## 2019-04-15 PROCEDURE — 93280 PM DEVICE PROGR EVAL DUAL: CPT

## 2019-04-15 PROCEDURE — 99213 OFFICE O/P EST LOW 20 MIN: CPT | Mod: 25

## 2019-04-15 RX ORDER — DONEPEZIL HYDROCHLORIDE 5 MG/1
5 TABLET ORAL
Refills: 0 | Status: ACTIVE | COMMUNITY

## 2019-04-15 RX ORDER — VERAPAMIL HYDROCHLORIDE 180 MG/1
180 TABLET ORAL DAILY
Refills: 0 | Status: ACTIVE | COMMUNITY

## 2019-04-15 RX ORDER — CARBIDOPA AND LEVODOPA 25; 100 MG/1; MG/1
TABLET ORAL
Refills: 0 | Status: DISCONTINUED | COMMUNITY
End: 2019-04-15

## 2019-04-15 RX ORDER — MECLIZINE HYDROCHLORIDE 25 MG/1
TABLET ORAL
Refills: 0 | Status: ACTIVE | COMMUNITY

## 2019-04-15 RX ORDER — ZOLPIDEM TARTRATE 10 MG/1
10 TABLET, FILM COATED ORAL
Refills: 0 | Status: ACTIVE | COMMUNITY

## 2019-04-15 RX ORDER — ATORVASTATIN CALCIUM 20 MG/1
20 TABLET, FILM COATED ORAL
Qty: 30 | Refills: 1 | Status: ACTIVE | COMMUNITY

## 2019-04-15 RX ORDER — OLANZAPINE 5 MG/1
5 TABLET, FILM COATED ORAL
Refills: 0 | Status: ACTIVE | COMMUNITY

## 2019-04-15 RX ORDER — CITALOPRAM HYDROBROMIDE 10 MG/1
10 TABLET, FILM COATED ORAL DAILY
Qty: 30 | Refills: 5 | Status: ACTIVE | COMMUNITY

## 2019-04-17 NOTE — DISCUSSION/SUMMARY
[FreeTextEntry1] : Ms. Antonio is a pleasant 73 year-old Togolese speaking female with a history of HOCM, syncope and resting bradycardia s/p dual chamber ICD placement 4/28/17.  Her device is functioning appropriately as programmed and all measured data is WNL.  No arrhythmia events for review.  Her LVOT gradient is significant reduced on most recent ECHO.  She remains programmed at DDDR to force RV pacing.  Echocardiogram ordered to evaluate cardiac structure and function.  Advised to follow-up with Dr. Stephenson for general cardiology care.  She will return for follow-up in six months and knows to call with any questions or concerns in the interim.

## 2019-04-17 NOTE — PROCEDURE
[No] : not [NSR] : normal sinus rhythm [ICD] : Implantable cardioverter-defibrillator [Sensing Amplitude ___mv] : sensing amplitude was [unfilled] mv [Lead Imp:  ___ohms] : lead impedance was [unfilled] ohms [___ ms] : [unfilled] ms [___V @] : [unfilled] V [de-identified] : Medtronic [de-identified] : [facility]ullen [de-identified] : 4/28/17 [de-identified] : AAIR -DDDR [de-identified] :  [de-identified] : > 5 years to LUIS [de-identified] :  96.4%

## 2019-04-17 NOTE — HISTORY OF PRESENT ILLNESS
[de-identified] : Ms. Antonio is a pleasant 73 year-old Korean speaking female with a history of recurrent syncope without significant prodrome.  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  She has NYHA Class I symptoms.  She underwent dual chamber ICD placement 4/28/17 for primary prevention along with RV pacing in an attempt to reduce her outflow tract gradient.  Repeat Echo 4/30/18 significant for a substantial reduction in LVOT gradient (resting 9 mmHg, peak 20 mmHg with Valsalva).  She continues to feel well and offers no device related complaints today.  No recurrent near syncope or syncope.

## 2019-04-17 NOTE — REASON FOR VISIT
[Follow-up Device Check] : follow-up device check visit [Family Member] : family member [Patient Declined  Services] : - None: Patient declined  services [FreeTextEntry3] : daughter served as

## 2020-12-16 NOTE — ED ADULT NURSE NOTE - NS ED PATIENT SAFETY CONCERN
POST-OP CHECK    Allergies    No Known Allergies    Intolerances        S: Pt awake and alert sitting comfortably in bed     Pain controlled. Pt denies N/V, SOB, CP, palpitations.    O:   T(C): 36.9 (12-16-20 @ 09:35), Max: 36.9 (12-16-20 @ 09:35)  HR: 73 (12-16-20 @ 12:00) (73 - 100)  BP: 116/68 (12-16-20 @ 12:00) (116/68 - 139/90)  RR: 18 (12-16-20 @ 12:00) (16 - 20)  SpO2: 99% (12-16-20 @ 12:00) (99% - 100%)  Wt(kg): --  I&O's Summary    16 Dec 2020 07:01  -  16 Dec 2020 13:00  --------------------------------------------------------  IN: 615 mL / OUT: 0 mL / NET: 615 mL        CV: RRR, S1S2  Lungs: CTA B/L  Abd: occassional BS x 4 quadrants, soft, appropriately tender,   Inc: Clean/dry/intact x 3 sites  Ext: PAS in place, Neg Homans B/L       No

## 2022-06-24 NOTE — DISCHARGE NOTE ADULT - HOSPITAL COURSE
72 y/o female PMHx HLD, depression, bradycardia, and syncope found to have HOCM with significant LVOT gradient, now s/p AICD. Pt was also medically optimized on Lopressor and Verapamil. Repeat echo after initiated on V Pacing 72 y/o female PMHx HLD, depression, bradycardia, and syncope found to have HOCM with significant LVOT gradient, now s/p AICD. Pt was also medically optimized on Lopressor and Verapamil. Repeat echo after initiated on V Pacing showed an improved gradient from 100 to 53 mmHg. Pt stable for discharge with appropriate follow up.    No complaints this morning.  Labs acceptable.  Vital Signs stable.  AICD site is stable.  Physical exam unremarkable (+murmur, known). No

## 2022-08-11 ENCOUNTER — NON-APPOINTMENT (OUTPATIENT)
Age: 77
End: 2022-08-11

## 2022-08-11 ENCOUNTER — APPOINTMENT (OUTPATIENT)
Dept: HEART AND VASCULAR | Facility: CLINIC | Age: 77
End: 2022-08-11

## 2022-08-11 PROCEDURE — 93295 DEV INTERROG REMOTE 1/2/MLT: CPT

## 2022-08-11 PROCEDURE — 93296 REM INTERROG EVL PM/IDS: CPT

## 2022-10-31 ENCOUNTER — APPOINTMENT (OUTPATIENT)
Dept: HEART AND VASCULAR | Facility: CLINIC | Age: 77
End: 2022-10-31

## 2022-10-31 VITALS
HEIGHT: 60 IN | BODY MASS INDEX: 37.11 KG/M2 | HEART RATE: 81 BPM | WEIGHT: 189 LBS | SYSTOLIC BLOOD PRESSURE: 136 MMHG | OXYGEN SATURATION: 96 % | DIASTOLIC BLOOD PRESSURE: 81 MMHG

## 2022-10-31 DIAGNOSIS — G20 PARKINSON'S DISEASE: ICD-10-CM

## 2022-10-31 PROCEDURE — 93280 PM DEVICE PROGR EVAL DUAL: CPT

## 2022-10-31 RX ORDER — ZOLPIDEM TARTRATE 5 MG/1
5 TABLET ORAL
Qty: 30 | Refills: 0 | Status: ACTIVE | COMMUNITY
Start: 2022-09-22

## 2022-10-31 RX ORDER — ALBUTEROL SULFATE 90 UG/1
108 (90 BASE) INHALANT RESPIRATORY (INHALATION)
Qty: 7 | Refills: 0 | Status: ACTIVE | COMMUNITY
Start: 2022-05-11

## 2022-10-31 RX ORDER — MIDODRINE HYDROCHLORIDE 5 MG/1
5 TABLET ORAL
Qty: 180 | Refills: 0 | Status: ACTIVE | COMMUNITY
Start: 2022-02-24

## 2022-10-31 RX ORDER — RIVAROXABAN 10 MG/1
10 TABLET, FILM COATED ORAL
Qty: 90 | Refills: 0 | Status: ACTIVE | COMMUNITY
Start: 2021-11-04

## 2022-10-31 RX ORDER — PANTOPRAZOLE 40 MG/1
40 TABLET, DELAYED RELEASE ORAL
Qty: 30 | Refills: 0 | Status: ACTIVE | COMMUNITY
Start: 2022-06-30

## 2022-10-31 RX ORDER — OLANZAPINE 10 MG/1
10 TABLET, FILM COATED ORAL
Qty: 30 | Refills: 0 | Status: ACTIVE | COMMUNITY
Start: 2022-09-22

## 2022-10-31 RX ORDER — CARBIDOPA AND LEVODOPA 25; 100 MG/1; MG/1
25-100 TABLET ORAL DAILY
Qty: 30 | Refills: 0 | Status: ACTIVE | COMMUNITY
Start: 2022-10-31

## 2022-10-31 RX ORDER — ALENDRONATE SODIUM 70 MG/1
70 TABLET ORAL
Qty: 12 | Refills: 0 | Status: ACTIVE | COMMUNITY
Start: 2022-05-18

## 2022-11-01 NOTE — HISTORY OF PRESENT ILLNESS
[de-identified] : Ms. Antonio is a pleasant 77 year-old Icelandic speaking female with a history of recurrent syncope without significant prodrome.  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  She has NYHA Class I symptoms.  She underwent dual chamber ICD placement 4/28/17 for primary prevention along with RV pacing in an attempt to reduce her outflow tract gradient.  Repeat Echo 4/30/18 significant for a substantial reduction in LVOT gradient (resting 9 mmHg, peak 20 mmHg with Valsalva).  She continues to feel well and offers no device related complaints today.  No recurrent presyncope/syncope.

## 2022-11-01 NOTE — ADDENDUM
[FreeTextEntry1] : I, Nalini Evans, hereby attest that the medical record entry for this patient accurately reflects signatures/notations that I made on the Date of Service in my capacity as an Attending Physician when I treated/diagnosed the above patient. I do hereby attest that this information is true, accurate and complete to the best of my knowledge.  I was present for the entire visit and supervised the entire visit and made/agree with the plan as outlined.\par

## 2022-11-01 NOTE — DISCUSSION/SUMMARY
[FreeTextEntry1] : Ms. Antonio is a pleasant 77 year-old [place] Korean speaking female with a history of HOCM, syncope and resting bradycardia s/p dual chamber ICD placement 4/28/17.  Her device is functioning appropriately as programmed and all measured data is WNL.  No arrhythmia events for review.  Her LVOT gradient is significant reduced on most recent ECHO.  She remains programmed at DDDR to force RV pacing.   Advised to follow-up with Dr. Stephenson for general cardiology care and routine echocardiogram in the setting of RV pacing.  She will return for follow-up in six months and knows to call with any questions or concerns in the interim.

## 2022-11-01 NOTE — PROCEDURE
[No] : not [NSR] : normal sinus rhythm [ICD] : Implantable cardioverter-defibrillator [Lead Imp:  ___ohms] : lead impedance was [unfilled] ohms [Sensing Amplitude ___mv] : sensing amplitude was [unfilled] mv [___V @] : [unfilled] V [___ ms] : [unfilled] ms [de-identified] : Medtronic [de-identified] : Cullen [de-identified] : 4/28/17 [de-identified] : AAIR -DDDR [de-identified] :  [de-identified] : 2.7 years to LUIS [de-identified] :  94.8

## 2022-11-10 ENCOUNTER — APPOINTMENT (OUTPATIENT)
Dept: HEART AND VASCULAR | Facility: CLINIC | Age: 77
End: 2022-11-10

## 2022-11-10 ENCOUNTER — NON-APPOINTMENT (OUTPATIENT)
Age: 77
End: 2022-11-10

## 2022-11-10 PROCEDURE — 93295 DEV INTERROG REMOTE 1/2/MLT: CPT

## 2022-11-10 PROCEDURE — 93296 REM INTERROG EVL PM/IDS: CPT

## 2023-02-09 ENCOUNTER — NON-APPOINTMENT (OUTPATIENT)
Age: 78
End: 2023-02-09

## 2023-02-09 ENCOUNTER — APPOINTMENT (OUTPATIENT)
Dept: HEART AND VASCULAR | Facility: CLINIC | Age: 78
End: 2023-02-09
Payer: MEDICARE

## 2023-02-09 PROCEDURE — 93295 DEV INTERROG REMOTE 1/2/MLT: CPT

## 2023-02-09 PROCEDURE — 93296 REM INTERROG EVL PM/IDS: CPT

## 2023-02-26 NOTE — DISCHARGE NOTE ADULT - NSTOBACCONEVERSMOKERY/N_GEN_A
No
I have personally provided the amount of critical care time documented below excluding time spent on separate procedures.

## 2023-05-11 ENCOUNTER — APPOINTMENT (OUTPATIENT)
Dept: HEART AND VASCULAR | Facility: CLINIC | Age: 78
End: 2023-05-11
Payer: MEDICARE

## 2023-05-11 ENCOUNTER — NON-APPOINTMENT (OUTPATIENT)
Age: 78
End: 2023-05-11

## 2023-05-11 PROCEDURE — 93295 DEV INTERROG REMOTE 1/2/MLT: CPT

## 2023-05-11 PROCEDURE — 93296 REM INTERROG EVL PM/IDS: CPT

## 2023-08-10 ENCOUNTER — NON-APPOINTMENT (OUTPATIENT)
Age: 78
End: 2023-08-10

## 2023-08-10 ENCOUNTER — APPOINTMENT (OUTPATIENT)
Dept: HEART AND VASCULAR | Facility: CLINIC | Age: 78
End: 2023-08-10
Payer: MEDICARE

## 2023-08-10 PROCEDURE — 93295 DEV INTERROG REMOTE 1/2/MLT: CPT

## 2023-08-10 PROCEDURE — 93296 REM INTERROG EVL PM/IDS: CPT

## 2023-11-13 ENCOUNTER — NON-APPOINTMENT (OUTPATIENT)
Age: 78
End: 2023-11-13

## 2023-11-13 ENCOUNTER — APPOINTMENT (OUTPATIENT)
Dept: HEART AND VASCULAR | Facility: CLINIC | Age: 78
End: 2023-11-13
Payer: MEDICARE

## 2023-11-13 PROCEDURE — 93296 REM INTERROG EVL PM/IDS: CPT

## 2023-11-13 PROCEDURE — 93295 DEV INTERROG REMOTE 1/2/MLT: CPT

## 2024-02-14 ENCOUNTER — APPOINTMENT (OUTPATIENT)
Dept: HEART AND VASCULAR | Facility: CLINIC | Age: 79
End: 2024-02-14

## 2024-03-20 ENCOUNTER — APPOINTMENT (OUTPATIENT)
Dept: HEART AND VASCULAR | Facility: CLINIC | Age: 79
End: 2024-03-20

## 2024-04-24 ENCOUNTER — APPOINTMENT (OUTPATIENT)
Dept: HEART AND VASCULAR | Facility: CLINIC | Age: 79
End: 2024-04-24

## 2024-05-29 ENCOUNTER — NON-APPOINTMENT (OUTPATIENT)
Age: 79
End: 2024-05-29

## 2024-05-29 ENCOUNTER — APPOINTMENT (OUTPATIENT)
Dept: HEART AND VASCULAR | Facility: CLINIC | Age: 79
End: 2024-05-29
Payer: MEDICARE

## 2024-05-29 PROCEDURE — 93296 REM INTERROG EVL PM/IDS: CPT

## 2024-05-29 PROCEDURE — 93295 DEV INTERROG REMOTE 1/2/MLT: CPT

## 2024-08-28 ENCOUNTER — APPOINTMENT (OUTPATIENT)
Dept: HEART AND VASCULAR | Facility: CLINIC | Age: 79
End: 2024-08-28

## 2024-10-03 ENCOUNTER — APPOINTMENT (OUTPATIENT)
Dept: HEART AND VASCULAR | Facility: CLINIC | Age: 79
End: 2024-10-03

## 2024-11-11 ENCOUNTER — APPOINTMENT (OUTPATIENT)
Dept: HEART AND VASCULAR | Facility: CLINIC | Age: 79
End: 2024-11-11

## 2024-11-26 ENCOUNTER — NON-APPOINTMENT (OUTPATIENT)
Age: 79
End: 2024-11-26

## 2024-11-26 ENCOUNTER — APPOINTMENT (OUTPATIENT)
Dept: HEART AND VASCULAR | Facility: CLINIC | Age: 79
End: 2024-11-26

## 2024-11-26 PROCEDURE — 93296 REM INTERROG EVL PM/IDS: CPT

## 2024-11-26 PROCEDURE — 93295 DEV INTERROG REMOTE 1/2/MLT: CPT

## 2025-01-06 NOTE — ED PROVIDER NOTE - CROS ED NEURO POS
date: 1969     Years since quittin.1    Smokeless tobacco: Former   Substance Use Topics    Alcohol use: No     Comment: rarely      Current Outpatient Medications   Medication Sig Dispense Refill    pantoprazole (PROTONIX) 40 MG tablet Take 1 tablet by mouth 2 times daily Indications: Takes one daily and 2nd one if needed 180 tablet 3    metFORMIN (GLUCOPHAGE-XR) 500 MG extended release tablet TAKE 2 TABLETS TWICE DAILY. 360 tablet 3    lisinopril (PRINIVIL;ZESTRIL) 20 MG tablet Take 1 tablet by mouth 2 times daily 180 tablet 3    levothyroxine (SYNTHROID) 50 MCG tablet Take 1 tablet by mouth daily 90 tablet 3    glimepiride (AMARYL) 2 MG tablet TAKE 2 TABLETS (4 MG) IN THE MORNING AND 1 TABLET (2 MG) IN THE EVENING 270 tablet 3    escitalopram (LEXAPRO) 20 MG tablet Take 1 tablet by mouth daily 90 tablet 3    atorvastatin (LIPITOR) 40 MG tablet Take 1 tablet by mouth daily 90 tablet 3    atenolol (TENORMIN) 50 MG tablet Take 0.5 tablets by mouth daily 45 tablet 3    blood glucose test strips (ACCU-CHEK ISABELA PLUS) strip USE 1 STRIP TO CHECK GLUCOSE ONCE DAILY 100 each 3    Lancets MISC Test sugar daily. Brand per pt preference. Dx: E11.9 100 each 3    pioglitazone (ACTOS) 30 MG tablet Take 1 tablet by mouth daily 90 tablet 3    buPROPion (WELLBUTRIN SR) 150 MG extended release tablet Take 1 tablet by mouth daily      acetaminophen (TYLENOL) 500 MG tablet Take 3 tablets by mouth every 6 hours as needed for Pain      Blood Glucose Monitoring Suppl (ACCU-CHEK ISABELA PLUS) w/Device KIT       blood glucose monitor kit and supplies Glucose monitor and supplies, brand per pt preference. Test sugar daily. Dx: E11.9 1 kit 0    aspirin 81 MG tablet Take 1 tablet by mouth nightly      FISH OIL Take by mouth nightly Arthur Red      Docusate Calcium (STOOL SOFTENER PO) Take by mouth nightly as needed        No current facility-administered medications for this visit.     No Known Allergies  Health Maintenance   Topic 
hpi

## 2025-02-25 ENCOUNTER — NON-APPOINTMENT (OUTPATIENT)
Age: 80
End: 2025-02-25

## 2025-02-25 ENCOUNTER — APPOINTMENT (OUTPATIENT)
Dept: HEART AND VASCULAR | Facility: CLINIC | Age: 80
End: 2025-02-25

## 2025-02-25 PROCEDURE — 93296 REM INTERROG EVL PM/IDS: CPT

## 2025-02-25 PROCEDURE — 93295 DEV INTERROG REMOTE 1/2/MLT: CPT

## 2025-05-28 ENCOUNTER — NON-APPOINTMENT (OUTPATIENT)
Age: 80
End: 2025-05-28

## 2025-05-28 ENCOUNTER — APPOINTMENT (OUTPATIENT)
Dept: HEART AND VASCULAR | Facility: CLINIC | Age: 80
End: 2025-05-28
Payer: MEDICARE

## 2025-05-28 PROCEDURE — 93294 REM INTERROG EVL PM/LDLS PM: CPT

## 2025-05-28 PROCEDURE — 93296 REM INTERROG EVL PM/IDS: CPT

## 2025-08-27 ENCOUNTER — APPOINTMENT (OUTPATIENT)
Dept: HEART AND VASCULAR | Facility: CLINIC | Age: 80
End: 2025-08-27
Payer: MEDICARE

## 2025-08-27 ENCOUNTER — NON-APPOINTMENT (OUTPATIENT)
Age: 80
End: 2025-08-27

## 2025-08-27 PROCEDURE — 93296 REM INTERROG EVL PM/IDS: CPT

## 2025-08-27 PROCEDURE — 93295 DEV INTERROG REMOTE 1/2/MLT: CPT
